# Patient Record
Sex: FEMALE | Race: WHITE | Employment: STUDENT | URBAN - METROPOLITAN AREA
[De-identification: names, ages, dates, MRNs, and addresses within clinical notes are randomized per-mention and may not be internally consistent; named-entity substitution may affect disease eponyms.]

---
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
## 2021-05-09 ENCOUNTER — HOSPITAL ENCOUNTER (EMERGENCY)
Age: 21
Discharge: HOME | End: 2021-05-09
Attending: EMERGENCY MEDICINE
Payer: MEDICAID

## 2021-05-09 VITALS
OXYGEN SATURATION: 100 % | BODY MASS INDEX: 26.21 KG/M2 | TEMPERATURE: 99.4 F | WEIGHT: 130 LBS | DIASTOLIC BLOOD PRESSURE: 79 MMHG | RESPIRATION RATE: 16 BRPM | HEIGHT: 59 IN | SYSTOLIC BLOOD PRESSURE: 129 MMHG | HEART RATE: 98 BPM

## 2021-05-09 DIAGNOSIS — R19.7 DIARRHEA, UNSPECIFIED TYPE: Primary | ICD-10-CM

## 2021-05-09 LAB
A/G RATIO: 1.3 (ref 1.1–2.2)
ALBUMIN SERPL-MCNC: 4.5 G/DL (ref 3.4–5)
ALP BLD-CCNC: 72 U/L (ref 40–129)
ALT SERPL-CCNC: 13 U/L (ref 10–40)
ANION GAP SERPL CALCULATED.3IONS-SCNC: 15 MMOL/L (ref 3–16)
AST SERPL-CCNC: 19 U/L (ref 15–37)
BASOPHILS ABSOLUTE: 0.1 K/UL (ref 0–0.2)
BASOPHILS RELATIVE PERCENT: 0.6 %
BILIRUB SERPL-MCNC: <0.2 MG/DL (ref 0–1)
BILIRUBIN URINE: NEGATIVE
BLOOD, URINE: NEGATIVE
BUN BLDV-MCNC: 6 MG/DL (ref 7–20)
CALCIUM SERPL-MCNC: 9.5 MG/DL (ref 8.3–10.6)
CHLORIDE BLD-SCNC: 101 MMOL/L (ref 99–110)
CLARITY: CLEAR
CO2: 21 MMOL/L (ref 21–32)
COLOR: YELLOW
CREAT SERPL-MCNC: 0.6 MG/DL (ref 0.6–1.1)
EOSINOPHILS ABSOLUTE: 0.2 K/UL (ref 0–0.6)
EOSINOPHILS RELATIVE PERCENT: 2.2 %
GFR AFRICAN AMERICAN: >60
GFR NON-AFRICAN AMERICAN: >60
GLOBULIN: 3.5 G/DL
GLUCOSE BLD-MCNC: 104 MG/DL (ref 70–99)
GLUCOSE URINE: NEGATIVE MG/DL
HCG QUALITATIVE: NEGATIVE
HCT VFR BLD CALC: 41.7 % (ref 36–48)
HEMOGLOBIN: 14.5 G/DL (ref 12–16)
KETONES, URINE: NEGATIVE MG/DL
LEUKOCYTE ESTERASE, URINE: NEGATIVE
LIPASE: 22 U/L (ref 13–60)
LYMPHOCYTES ABSOLUTE: 4.2 K/UL (ref 1–5.1)
LYMPHOCYTES RELATIVE PERCENT: 44.2 %
MCH RBC QN AUTO: 31.4 PG (ref 26–34)
MCHC RBC AUTO-ENTMCNC: 34.9 G/DL (ref 31–36)
MCV RBC AUTO: 90 FL (ref 80–100)
MICROSCOPIC EXAMINATION: NORMAL
MONOCYTES ABSOLUTE: 0.8 K/UL (ref 0–1.3)
MONOCYTES RELATIVE PERCENT: 8.1 %
NEUTROPHILS ABSOLUTE: 4.3 K/UL (ref 1.7–7.7)
NEUTROPHILS RELATIVE PERCENT: 44.9 %
NITRITE, URINE: NEGATIVE
PDW BLD-RTO: 12 % (ref 12.4–15.4)
PH UA: 6.5 (ref 5–8)
PLATELET # BLD: 330 K/UL (ref 135–450)
PMV BLD AUTO: 8.5 FL (ref 5–10.5)
POTASSIUM REFLEX MAGNESIUM: 3.7 MMOL/L (ref 3.5–5.1)
PROTEIN UA: NEGATIVE MG/DL
RBC # BLD: 4.63 M/UL (ref 4–5.2)
SARS-COV-2, NAAT: NOT DETECTED
SODIUM BLD-SCNC: 137 MMOL/L (ref 136–145)
SPECIFIC GRAVITY UA: <=1.005 (ref 1–1.03)
TOTAL PROTEIN: 8 G/DL (ref 6.4–8.2)
URINE REFLEX TO CULTURE: NORMAL
URINE TYPE: NORMAL
UROBILINOGEN, URINE: 0.2 E.U./DL
WBC # BLD: 9.5 K/UL (ref 4–11)

## 2021-05-09 PROCEDURE — 36415 COLL VENOUS BLD VENIPUNCTURE: CPT

## 2021-05-09 PROCEDURE — 83690 ASSAY OF LIPASE: CPT

## 2021-05-09 PROCEDURE — 81003 URINALYSIS AUTO W/O SCOPE: CPT

## 2021-05-09 PROCEDURE — 84703 CHORIONIC GONADOTROPIN ASSAY: CPT

## 2021-05-09 PROCEDURE — 2580000003 HC RX 258: Performed by: EMERGENCY MEDICINE

## 2021-05-09 PROCEDURE — 87635 SARS-COV-2 COVID-19 AMP PRB: CPT

## 2021-05-09 PROCEDURE — 99283 EMERGENCY DEPT VISIT LOW MDM: CPT

## 2021-05-09 PROCEDURE — 85025 COMPLETE CBC W/AUTO DIFF WBC: CPT

## 2021-05-09 PROCEDURE — 80053 COMPREHEN METABOLIC PANEL: CPT

## 2021-05-09 RX ORDER — 0.9 % SODIUM CHLORIDE 0.9 %
1000 INTRAVENOUS SOLUTION INTRAVENOUS ONCE
Status: COMPLETED | OUTPATIENT
Start: 2021-05-09 | End: 2021-05-09

## 2021-05-09 RX ADMIN — SODIUM CHLORIDE 1000 ML: 9 INJECTION, SOLUTION INTRAVENOUS at 20:08

## 2021-05-09 ASSESSMENT — PAIN DESCRIPTION - PAIN TYPE: TYPE: ACUTE PAIN

## 2021-05-09 ASSESSMENT — PAIN SCALES - GENERAL: PAINLEVEL_OUTOF10: 5

## 2021-05-09 ASSESSMENT — PAIN DESCRIPTION - ONSET: ONSET: PROGRESSIVE

## 2021-05-09 ASSESSMENT — PAIN DESCRIPTION - LOCATION: LOCATION: ABDOMEN;BACK

## 2021-05-10 NOTE — ED PROVIDER NOTES
[address] [facility]  ED      CHIEF COMPLAINT  Abdominal Pain (Patietn c/o of lower back and lower abdominal pain for one day. Diarrhea noted also. ), Back Pain, and Fever       HISTORY OF PRESENT ILLNESS  Jaja Martinez is a 22 y.o. female  who presents to the ED for evaluation of loose stools. Patient reports having loose stools over the past 3 days. Reports she has been having about 3 episodes of loose stools. Reports her stools had some yellow specks. Denies any blood in the stool or black stools. Says she has also been having some cramping to the lower abdomen that is often relieved with stools. Denies any changes in symptoms. Reports she has not tried any medications at home. Denies having dysuria, urinary urgency, or frequency. Reports her highest temp at home was 100. Says her temperature was elevated at 99.1 here. No other complaints, modifying factors or associated symptoms. I have reviewed the following from the nursing documentation. Past Medical History:   Diagnosis Date    Asthma      Past Surgical History:   Procedure Laterality Date    ADENOIDECTOMY       History reviewed. No pertinent family history.   Social History     Socioeconomic History    Marital status: Single     Spouse name: Not on file    Number of children: Not on file    Years of education: Not on file    Highest education level: Not on file   Occupational History    Not on file   Social Needs    Financial resource strain: Not on file    Food insecurity     Worry: Not on file     Inability: Not on file    Transportation needs     Medical: Not on file     Non-medical: Not on file   Tobacco Use    Smoking status: Never Smoker    Smokeless tobacco: Never Used   Substance and Sexual Activity    Alcohol use: No    Drug use: Not Currently    Sexual activity: Yes     Partners: Male   Lifestyle    Physical activity     Days per week: Not on file     Minutes per session: Not on file    Stress: Not on Rapid    Specimen: Nasopharyngeal Swab; Throat   Result Value Ref Range    SARS-CoV-2, NAAT Not Detected Not Detected   Comprehensive Metabolic Panel w/ Reflex to MG   Result Value Ref Range    Sodium 137 136 - 145 mmol/L    Potassium reflex Magnesium 3.7 3.5 - 5.1 mmol/L    Chloride 101 99 - 110 mmol/L    CO2 21 21 - 32 mmol/L    Anion Gap 15 3 - 16    Glucose 104 (H) 70 - 99 mg/dL    BUN 6 (L) 7 - 20 mg/dL    CREATININE 0.6 0.6 - 1.1 mg/dL    GFR Non-African American >60 >60    GFR African American >60 >60    Calcium 9.5 8.3 - 10.6 mg/dL    Total Protein 8.0 6.4 - 8.2 g/dL    Albumin 4.5 3.4 - 5.0 g/dL    Albumin/Globulin Ratio 1.3 1.1 - 2.2    Total Bilirubin <0.2 0.0 - 1.0 mg/dL    Alkaline Phosphatase 72 40 - 129 U/L    ALT 13 10 - 40 U/L    AST 19 15 - 37 U/L    Globulin 3.5 g/dL   CBC auto differential   Result Value Ref Range    WBC 9.5 4.0 - 11.0 K/uL    RBC 4.63 4.00 - 5.20 M/uL    Hemoglobin 14.5 12.0 - 16.0 g/dL    Hematocrit 41.7 36.0 - 48.0 %    MCV 90.0 80.0 - 100.0 fL    MCH 31.4 26.0 - 34.0 pg    MCHC 34.9 31.0 - 36.0 g/dL    RDW 12.0 (L) 12.4 - 15.4 %    Platelets 136 329 - 477 K/uL    MPV 8.5 5.0 - 10.5 fL    Neutrophils % 44.9 %    Lymphocytes % 44.2 %    Monocytes % 8.1 %    Eosinophils % 2.2 %    Basophils % 0.6 %    Neutrophils Absolute 4.3 1.7 - 7.7 K/uL    Lymphocytes Absolute 4.2 1.0 - 5.1 K/uL    Monocytes Absolute 0.8 0.0 - 1.3 K/uL    Eosinophils Absolute 0.2 0.0 - 0.6 K/uL    Basophils Absolute 0.1 0.0 - 0.2 K/uL   Urinalysis Reflex to Culture    Specimen: Urine, clean catch   Result Value Ref Range    Color, UA Yellow Straw/Yellow    Clarity, UA Clear Clear    Glucose, Ur Negative Negative mg/dL    Bilirubin Urine Negative Negative    Ketones, Urine Negative Negative mg/dL    Specific Gravity, UA <=1.005 1.005 - 1.030    Blood, Urine Negative Negative    pH, UA 6.5 5.0 - 8.0    Protein, UA Negative Negative mg/dL    Urobilinogen, Urine 0.2 <2.0 E.U./dL    Nitrite, Urine Negative Negative    Leukocyte Esterase, Urine Negative Negative    Microscopic Examination Not Indicated     Urine Type NotGiven     Urine Reflex to Culture Not Indicated    HCG Qualitative, Serum   Result Value Ref Range    hCG Qual Negative Detects HCG level >10 MIU/mL   Lipase   Result Value Ref Range    Lipase 22.0 13.0 - 60.0 U/L       I have reviewed all labs for this visit. RADIOLOGY  No results found. ED COURSE/MDM  Patient seen and evaluated. At presentation, patient was awake, alert, afebrile, normotensive, tachycardic to 130 after IV which quickly improved to 101. Patient had soft, nondistended abdomen that was nontender to palpation throughout all 4 quadrants. She had no tenderness to palpation over the left lower quadrant or right lower quadrant. Discussed given no tenderness to palpation over the right lower quadrant, and the abdominal pain that is relieved after bowel movement, no fevers, I have low suspicion for appendicitis at this time. Given no blood in the stool, no recent antibiotics, have low suspicion for C. difficile. Patient denies any recent travels/camping. Given 3 loose stools per day, discussed following up with PCP for further evaluation and treatment which may include stool studies. Made shared decision with patient and she declined for study at this time. However, basic labs,pregnancy test, and urinalysis obtained to assess for ectopic pregnancy/ UTI/ pyelonephritis. Patient given 1 L IV fluid bolus. On reassessment, heart rate improved to 78. Creatinine normal.  No leukocytosis present. Urinalysis negative. Pregnancy test negative. Lipase within normal limits. Patient instructed to follow-up with PCP for further evaluation and treatment. She was discharged home with strict return precautions. Pt was seen during the [place] 19 pandemic. Appropriate PPE worn by ME during patient encounters.  Pt seen during a time with constrained hospital bed capacity and other potential

## 2022-02-08 ENCOUNTER — HOSPITAL ENCOUNTER (EMERGENCY)
Age: 22
Discharge: HOME | End: 2022-02-08
Payer: MEDICAID

## 2022-02-08 VITALS
WEIGHT: 130 LBS | RESPIRATION RATE: 18 BRPM | BODY MASS INDEX: 26.21 KG/M2 | SYSTOLIC BLOOD PRESSURE: 129 MMHG | OXYGEN SATURATION: 99 % | TEMPERATURE: 98.3 F | DIASTOLIC BLOOD PRESSURE: 91 MMHG | HEIGHT: 59 IN | HEART RATE: 98 BPM

## 2022-02-08 DIAGNOSIS — R51.9 NONINTRACTABLE EPISODIC HEADACHE, UNSPECIFIED HEADACHE TYPE: ICD-10-CM

## 2022-02-08 DIAGNOSIS — R11.2 NAUSEA AND VOMITING, INTRACTABILITY OF VOMITING NOT SPECIFIED, UNSPECIFIED VOMITING TYPE: Primary | ICD-10-CM

## 2022-02-08 LAB
A/G RATIO: 1.5 (ref 1.1–2.2)
ALBUMIN SERPL-MCNC: 4.4 G/DL (ref 3.4–5)
ALP BLD-CCNC: 75 U/L (ref 40–129)
ALT SERPL-CCNC: 10 U/L (ref 10–40)
ANION GAP SERPL CALCULATED.3IONS-SCNC: 18 MMOL/L (ref 3–16)
AST SERPL-CCNC: 17 U/L (ref 15–37)
BASOPHILS ABSOLUTE: 0 K/UL (ref 0–0.2)
BASOPHILS RELATIVE PERCENT: 0.2 %
BILIRUB SERPL-MCNC: <0.2 MG/DL (ref 0–1)
BUN BLDV-MCNC: 7 MG/DL (ref 7–20)
CALCIUM SERPL-MCNC: 9.1 MG/DL (ref 8.3–10.6)
CHLORIDE BLD-SCNC: 100 MMOL/L (ref 99–110)
CO2: 21 MMOL/L (ref 21–32)
CREAT SERPL-MCNC: 0.7 MG/DL (ref 0.6–1.1)
EOSINOPHILS ABSOLUTE: 0.1 K/UL (ref 0–0.6)
EOSINOPHILS RELATIVE PERCENT: 0.9 %
GFR AFRICAN AMERICAN: >60
GFR NON-AFRICAN AMERICAN: >60
GLUCOSE BLD-MCNC: 107 MG/DL (ref 70–99)
HCG QUALITATIVE: NEGATIVE
HCT VFR BLD CALC: 41.2 % (ref 36–48)
HEMOGLOBIN: 14.1 G/DL (ref 12–16)
LYMPHOCYTES ABSOLUTE: 2.5 K/UL (ref 1–5.1)
LYMPHOCYTES RELATIVE PERCENT: 17.2 %
MCH RBC QN AUTO: 30.7 PG (ref 26–34)
MCHC RBC AUTO-ENTMCNC: 34.2 G/DL (ref 31–36)
MCV RBC AUTO: 89.7 FL (ref 80–100)
MONOCYTES ABSOLUTE: 0.8 K/UL (ref 0–1.3)
MONOCYTES RELATIVE PERCENT: 5.5 %
NEUTROPHILS ABSOLUTE: 10.9 K/UL (ref 1.7–7.7)
NEUTROPHILS RELATIVE PERCENT: 76.2 %
PDW BLD-RTO: 11.9 % (ref 12.4–15.4)
PLATELET # BLD: 311 K/UL (ref 135–450)
PMV BLD AUTO: 8.8 FL (ref 5–10.5)
POTASSIUM REFLEX MAGNESIUM: 4.2 MMOL/L (ref 3.5–5.1)
RBC # BLD: 4.59 M/UL (ref 4–5.2)
SODIUM BLD-SCNC: 139 MMOL/L (ref 136–145)
TOTAL PROTEIN: 7.4 G/DL (ref 6.4–8.2)
WBC # BLD: 14.3 K/UL (ref 4–11)

## 2022-02-08 PROCEDURE — 96374 THER/PROPH/DIAG INJ IV PUSH: CPT

## 2022-02-08 PROCEDURE — 85025 COMPLETE CBC W/AUTO DIFF WBC: CPT

## 2022-02-08 PROCEDURE — 84703 CHORIONIC GONADOTROPIN ASSAY: CPT

## 2022-02-08 PROCEDURE — 2580000003 HC RX 258: Performed by: NURSE PRACTITIONER

## 2022-02-08 PROCEDURE — 80053 COMPREHEN METABOLIC PANEL: CPT

## 2022-02-08 PROCEDURE — 96375 TX/PRO/DX INJ NEW DRUG ADDON: CPT

## 2022-02-08 PROCEDURE — 6360000002 HC RX W HCPCS: Performed by: NURSE PRACTITIONER

## 2022-02-08 PROCEDURE — 99283 EMERGENCY DEPT VISIT LOW MDM: CPT

## 2022-02-08 RX ORDER — ONDANSETRON 4 MG/1
4 TABLET, ORALLY DISINTEGRATING ORAL EVERY 8 HOURS PRN
Qty: 20 TABLET | Refills: 0 | Status: SHIPPED | OUTPATIENT
Start: 2022-02-08

## 2022-02-08 RX ORDER — PROCHLORPERAZINE EDISYLATE 5 MG/ML
10 INJECTION INTRAMUSCULAR; INTRAVENOUS EVERY 6 HOURS PRN
Status: DISCONTINUED | OUTPATIENT
Start: 2022-02-08 | End: 2022-02-08

## 2022-02-08 RX ORDER — DIPHENHYDRAMINE HYDROCHLORIDE 50 MG/ML
25 INJECTION INTRAMUSCULAR; INTRAVENOUS ONCE
Status: COMPLETED | OUTPATIENT
Start: 2022-02-08 | End: 2022-02-08

## 2022-02-08 RX ORDER — PROCHLORPERAZINE EDISYLATE 5 MG/ML
10 INJECTION INTRAMUSCULAR; INTRAVENOUS EVERY 6 HOURS PRN
Status: DISCONTINUED | OUTPATIENT
Start: 2022-02-08 | End: 2022-02-09 | Stop reason: HOSPADM

## 2022-02-08 RX ORDER — KETOROLAC TROMETHAMINE 30 MG/ML
15 INJECTION, SOLUTION INTRAMUSCULAR; INTRAVENOUS ONCE
Status: COMPLETED | OUTPATIENT
Start: 2022-02-08 | End: 2022-02-08

## 2022-02-08 RX ADMIN — PROCHLORPERAZINE EDISYLATE 10 MG: 5 INJECTION INTRAMUSCULAR; INTRAVENOUS at 22:00

## 2022-02-08 RX ADMIN — DEXTROSE AND SODIUM CHLORIDE 1000 ML: 5; 900 INJECTION, SOLUTION INTRAVENOUS at 22:51

## 2022-02-08 RX ADMIN — DIPHENHYDRAMINE HYDROCHLORIDE 25 MG: 50 INJECTION, SOLUTION INTRAMUSCULAR; INTRAVENOUS at 22:00

## 2022-02-08 RX ADMIN — KETOROLAC TROMETHAMINE 15 MG: 30 INJECTION, SOLUTION INTRAMUSCULAR at 22:00

## 2022-02-08 ASSESSMENT — PAIN SCALES - GENERAL
PAINLEVEL_OUTOF10: 6
PAINLEVEL_OUTOF10: 2
PAINLEVEL_OUTOF10: 6

## 2022-02-08 ASSESSMENT — PAIN DESCRIPTION - LOCATION: LOCATION: HEAD

## 2022-02-08 ASSESSMENT — PAIN DESCRIPTION - PAIN TYPE: TYPE: ACUTE PAIN

## 2022-02-09 NOTE — ED NOTES
Discharge instructions given. Verbalized understanding. Denies further questions or concerns. A&Ox4. Ambulates from ED with steady gait.       Ian Pond RN  02/08/22 [id number]

## 2022-02-09 NOTE — ED PROVIDER NOTES
Evaluated by [id number] [facility] Provider    [address] [facility]  ED    CHIEF COMPLAINT    Headache (started today, 6/10 pain, associated with n/v, uti yesterday, prescribed abx), Nausea, and Emesis    HISTORY OF PRESENT ILLNESS  Sanchez Elkins is a 25 y.o. female who presents to the ED complaining of headache. She started with a HA today and then developed nausea, vomiting. She was diagnosed with UTI yesterday and started antibiotics, Bactrim. She took tylenol but it is not helping for the headache. She was started on bactrim. Does not typically get HA. Denies neck pain. Patient reports nausea and vomiting in addition to the headache. She denies chest pain or shortness of breath. Patient reports the pain is constant and is currently rating it as 8/10 and describes it as an aching type of pain. The patient arrived to the ED via private car. PAST MEDICAL HISTORY    Past Medical History:   Diagnosis Date    Asthma        SURGICAL HISTORY    Past Surgical History:   Procedure Laterality Date    ADENOIDECTOMY         CURRENT MEDICATIONS    Current Outpatient Rx   Medication Sig Dispense Refill    ondansetron (ZOFRAN ODT) 4 MG disintegrating tablet Take 1 tablet by mouth every 8 hours as needed for Nausea 20 tablet 0    drospirenone-ethinyl estradiol (FAZAL) 3-0.02 MG per tablet Take 1 tablet by mouth         ALLERGIES    Allergies   Allergen Reactions    Motrin [Ibuprofen Micronized] Other (See Comments)     LETHARGIC      Penicillins Rash       FAMILY HISTORY    History reviewed. No pertinent family history.     SOCIAL HISTORY    Social History     Socioeconomic History    Marital status: Single     Spouse name: None    Number of children: None    Years of education: None    Highest education level: None   Occupational History    None   Tobacco Use    Smoking status: Never Smoker    Smokeless tobacco: Never Used   Vaping Use    Vaping Use: Never used   Substance and Sexual Activity    Alcohol use: No    Drug use: Not Currently    Sexual activity: Yes     Partners: Male   Other Topics Concern    None   Social History Narrative    None     Social Determinants of Health     Financial Resource Strain:     Difficulty of Paying Living Expenses: Not on file   Food Insecurity:     Worried About Running Out of Food in the Last Year: Not on file    Jayce of Food in the Last Year: Not on file   Transportation Needs:     Lack of Transportation (Medical): Not on file    Lack of Transportation (Non-Medical): Not on file   Physical Activity:     Days of Exercise per Week: Not on file    Minutes of Exercise per Session: Not on file   Stress:     Feeling of Stress : Not on file   Social Connections:     Frequency of Communication with Friends and Family: Not on file    Frequency of Social Gatherings with Friends and Family: Not on file    Attends Yarsanism Services: Not on file    Active Member of [address] [place] [address] [employer] or Organizations: Not on file    Attends Club or Organization Meetings: Not on file    Marital Status: Not on file   Intimate Partner Violence:     Fear of Current or Ex-Partner: Not on file    Emotionally Abused: Not on file    Physically Abused: Not on file    Sexually Abused: Not on file   Housing Stability:     Unable to Pay for Housing in the Last Year: Not on file    Number of [place] in the Last Year: Not on file    Unstable Housing in the Last Year: Not on file     REVIEW OFSYSTEMS    10systems reviewed, pertinent positives per HPI otherwise noted to be negative    PHYSICAL EXAM  Physical Exam  Vitals:    02/08/22 2110   BP: (!) 129/91   Pulse: 98   Resp: 18   Temp: 98.3 °F (36.8 °C)   SpO2: 99%     GENERAL: Patient is well-developed, well-nourished,  no acutedistress. Mildly distressed due to her current symptoms but not toxic in appearance. HEENT:  PERRL, EOMI. Normocephalic, atraumatic. Conjunctiva appear normal.  External ears are normal.    NECK: Supple with normal ROM.  Trachea midline. Negative Kernig's and Brudzinski's. LUNGS:  Normal work of breathing. Speaking comfortably in full sentences. Lungs are clear bilaterally to auscultation. Without wheezing, rales, or rhonchi. CADIOVASCULAR:  Regular rate and rhythm. Normal S1-S2 sounds. No murmurs, rubs, or gallops. GI: Soft, nontender, nondistended with positive bowel sounds. No rebound tenderness, guarding or any peritoneal signs. No masses or hepatosplenomegaly   EXTREMITIES: Distal pulses w/o edema. MUSCULOSKELETAL:  Atraumatic extremities with normal ROM grossly. No obvious bony deformities. SKIN: Warm/dry. No rashes/lesions noted. PSYCHIATRIC: Patient is alert and oriented with normal affect  NEUROLOGIC: Cranial nerves II through XII are intact. Alert and oriented. Moves all extremities with equal strength. No gross sensory deficits. Answers questions/follows commands appropriately. LABS  I have reviewed all labs for this visit.    Results for orders placed or performed during the hospital encounter of 02/08/22   CBC Auto Differential   Result Value Ref Range    WBC 14.3 (H) 4.0 - 11.0 K/uL    RBC 4.59 4.00 - 5.20 M/uL    Hemoglobin 14.1 12.0 - 16.0 g/dL    Hematocrit 41.2 36.0 - 48.0 %    MCV 89.7 80.0 - 100.0 fL    MCH 30.7 26.0 - 34.0 pg    MCHC 34.2 31.0 - 36.0 g/dL    RDW 11.9 (L) 12.4 - 15.4 %    Platelets 825 147 - 470 K/uL    MPV 8.8 5.0 - 10.5 fL    Neutrophils % 76.2 %    Lymphocytes % 17.2 %    Monocytes % 5.5 %    Eosinophils % 0.9 %    Basophils % 0.2 %    Neutrophils Absolute 10.9 (H) 1.7 - 7.7 K/uL    Lymphocytes Absolute 2.5 1.0 - 5.1 K/uL    Monocytes Absolute 0.8 0.0 - 1.3 K/uL    Eosinophils Absolute 0.1 0.0 - 0.6 K/uL    Basophils Absolute 0.0 0.0 - 0.2 K/uL   Comprehensive Metabolic Panel w/ Reflex to MG   Result Value Ref Range    Sodium 139 136 - 145 mmol/L    Potassium reflex Magnesium 4.2 3.5 - 5.1 mmol/L    Chloride 100 99 - 110 mmol/L    CO2 21 21 - 32 mmol/L    Anion Gap 18 (H) 3 - 16    Glucose 107 (H) 70 - 99 mg/dL    BUN 7 7 - 20 mg/dL    CREATININE 0.7 0.6 - 1.1 mg/dL    GFR Non-African American >60 >60    GFR African American >60 >60    Calcium 9.1 8.3 - 10.6 mg/dL    Total Protein 7.4 6.4 - 8.2 g/dL    Albumin 4.4 3.4 - 5.0 g/dL    Albumin/Globulin Ratio 1.5 1.1 - 2.2    Total Bilirubin <0.2 0.0 - 1.0 mg/dL    Alkaline Phosphatase 75 40 - 129 U/L    ALT 10 10 - 40 U/L    AST 17 15 - 37 U/L   HCG Qualitative, Serum   Result Value Ref Range    hCG Qual Negative Detects HCG level >10 MIU/mL     RADIOLOGY    No results found. ED COURSE & MEDICAL DECISION MAKING    Patient seen and evaluated. Old records reviewed. Diagnostic testing reviewed and results discussed. I have evaluated this patient. My supervising physician was available for consultation. Madhavi Mancia presented to the ED today with above noted complaints. On physical exam patient is neurologically intact and without focal or lateralizing deficits on exam. Patient is currently afebrile, not tachycardic, BP is normotensive. Patient is without signs of meningeal irritation. I am not concerned for infectious cause of the headache at this time. Blood work shows leukocytosis is WBC elevated at 14.3, absolute neutrophils elevated at 10.9. No further differential shift. No anemia. No electrolyte abnormality. No evidence of acute kidney injury or transaminitis. Pregnancy test negative. Pt was given the following medications or treatments in the ED:   Medications   prochlorperazine (COMPAZINE) injection 10 mg (10 mg IntraVENous Given 2/8/22 2200)   dextrose 5 % and 0.9 % nacl bolus (0 mLs IntraVENous Stopped 2/8/22 2333)   ketorolac (TORADOL) injection 15 mg (15 mg IntraVENous Given 2/8/22 2200)   diphenhydrAMINE (BENADRYL) injection 25 mg (25 mg IntraVENous Given 2/8/22 2200)     Patient was given the above medications and upon reevaluation reported significant improvement in her symptoms.   She was able to tolerate an oral challenge here in the ER. She reported that her headache had completely resolved. At this point I do not feel the patient requires further work up and it is reasonable to discharge the patient. A discussion was had with the patient regarding diagnosis, diagnostic testing results,treatment/ plan of care, and follow up. All questions were answered. Patient will follow up as directed for further evaluation/treatment. The patient was given strict return precautions as we discussed symptoms that wouldnecessitate return to the ED. Patient will return to ED for new/worsening symptoms. The patient verbalized their understanding and agreement with the above plan. Patient will be started on the following medicationsfrom the ED:  Discharge Medication List as of 2/8/2022 11:52 PM      START taking these medications    Details   ondansetron (ZOFRAN ODT) 4 MG disintegrating tablet Take 1 tablet by mouth every 8 hours as needed for Nausea, Disp-20 tablet, R-0Normal           I estimatethere is LOW risk for SUBARACHNOID HEMORRHAGE, MENINGITIS, INTRACRANIAL HEMORRHAGE, SUBDURAL HEMATOMA, OR STROKE, thus I consider the discharge disposition reasonable. Felipe Swanson and I have discussed the diagnosis and risks, andwe agree with discharging home to follow-up with their primary doctor. We also discussed returning to the Emergency Department immediately if new or worsening symptoms occur. We have discussed the symptoms which are mostconcerning (e.g., changing or worsening pain, weakness, vomiting, fever) that necessitate immediate return. Clinical Impression    1. Nausea and vomiting, intractability of vomiting not specified, unspecified vomiting type    2. Nonintractable episodic headache, unspecified headache type        Discharge Vital Signs:  Blood pressure (!) 129/91, pulse 98, temperature 98.3 °F (36.8 °C), temperature source Oral, resp.  rate 18, height 4' 11\" (1.499 m), weight 130 lb (59 kg), SpO2 99 %. FOLLOW UP  Raheem Patel MD  [address] [place]  [phone]    Call in 2 days  For further evaluation    [facility]  [facility] [address] [phone]  Go to   If symptoms worsen      DISPOSITION  Patient was discharged to home in good condition. Comment: Please note this report has been produced using speech recognition software and may contain errors related to thatsystem including errors in grammar, punctuation, and spelling, as well as words and phrases that may be inappropriate. If there are any questions or concerns please feel free to contact the dictating provider forclarification.         ANTIONETTE Humphreys - CNP  02/09/22 9267

## 2022-02-09 NOTE — ED NOTES
Pt drank glass of water and ate long crackers and pretzels. No nausea reported.       Miracle Silveira RN  02/08/22 6461

## 2022-11-10 ENCOUNTER — HOSPITAL ENCOUNTER (EMERGENCY)
Age: 22
Discharge: HOME | End: 2022-11-10
Payer: MEDICAID

## 2022-11-10 ENCOUNTER — APPOINTMENT (OUTPATIENT)
Dept: GENERAL RADIOLOGY | Age: 22
End: 2022-11-10
Payer: MEDICAID

## 2022-11-10 VITALS
SYSTOLIC BLOOD PRESSURE: 159 MMHG | DIASTOLIC BLOOD PRESSURE: 85 MMHG | OXYGEN SATURATION: 100 % | WEIGHT: 130 LBS | TEMPERATURE: 99 F | RESPIRATION RATE: 16 BRPM | HEIGHT: 59 IN | BODY MASS INDEX: 26.21 KG/M2 | HEART RATE: 99 BPM

## 2022-11-10 DIAGNOSIS — M54.6 PAIN IN THORACIC SPINE: ICD-10-CM

## 2022-11-10 DIAGNOSIS — R03.0 ELEVATED BLOOD PRESSURE READING: ICD-10-CM

## 2022-11-10 DIAGNOSIS — R07.1 CHEST PAIN ON BREATHING: Primary | ICD-10-CM

## 2022-11-10 LAB
A/G RATIO: 1.4 (ref 1.1–2.2)
ALBUMIN SERPL-MCNC: 4.7 G/DL (ref 3.4–5)
ALP BLD-CCNC: 88 U/L (ref 40–129)
ALT SERPL-CCNC: 17 U/L (ref 10–40)
ANION GAP SERPL CALCULATED.3IONS-SCNC: 10 MMOL/L (ref 3–16)
AST SERPL-CCNC: 17 U/L (ref 15–37)
BASOPHILS ABSOLUTE: 0.1 K/UL (ref 0–0.2)
BASOPHILS RELATIVE PERCENT: 0.6 %
BILIRUB SERPL-MCNC: 0.3 MG/DL (ref 0–1)
BUN BLDV-MCNC: 7 MG/DL (ref 7–20)
CALCIUM SERPL-MCNC: 9.3 MG/DL (ref 8.3–10.6)
CHLORIDE BLD-SCNC: 100 MMOL/L (ref 99–110)
CO2: 25 MMOL/L (ref 21–32)
CREAT SERPL-MCNC: 0.6 MG/DL (ref 0.6–1.1)
D DIMER: 0.4 UG/ML FEU (ref 0–0.6)
EOSINOPHILS ABSOLUTE: 0.2 K/UL (ref 0–0.6)
EOSINOPHILS RELATIVE PERCENT: 1.8 %
GFR SERPL CREATININE-BSD FRML MDRD: >60 ML/MIN/{1.73_M2}
GLUCOSE BLD-MCNC: 107 MG/DL (ref 70–99)
HCG QUALITATIVE: NEGATIVE
HCT VFR BLD CALC: 41.8 % (ref 36–48)
HEMOGLOBIN: 14.1 G/DL (ref 12–16)
LYMPHOCYTES ABSOLUTE: 2.4 K/UL (ref 1–5.1)
LYMPHOCYTES RELATIVE PERCENT: 19.7 %
MAGNESIUM: 2 MG/DL (ref 1.8–2.4)
MCH RBC QN AUTO: 30 PG (ref 26–34)
MCHC RBC AUTO-ENTMCNC: 33.7 G/DL (ref 31–36)
MCV RBC AUTO: 89 FL (ref 80–100)
MONOCYTES ABSOLUTE: 0.9 K/UL (ref 0–1.3)
MONOCYTES RELATIVE PERCENT: 7 %
NEUTROPHILS ABSOLUTE: 8.7 K/UL (ref 1.7–7.7)
NEUTROPHILS RELATIVE PERCENT: 70.9 %
PDW BLD-RTO: 12.3 % (ref 12.4–15.4)
PLATELET # BLD: 289 K/UL (ref 135–450)
PMV BLD AUTO: 8.2 FL (ref 5–10.5)
POTASSIUM REFLEX MAGNESIUM: 3.4 MMOL/L (ref 3.5–5.1)
RBC # BLD: 4.69 M/UL (ref 4–5.2)
SODIUM BLD-SCNC: 135 MMOL/L (ref 136–145)
TOTAL PROTEIN: 8 G/DL (ref 6.4–8.2)
TROPONIN: <0.01 NG/ML
WBC # BLD: 12.2 K/UL (ref 4–11)

## 2022-11-10 PROCEDURE — 85379 FIBRIN DEGRADATION QUANT: CPT

## 2022-11-10 PROCEDURE — 83735 ASSAY OF MAGNESIUM: CPT

## 2022-11-10 PROCEDURE — 99285 EMERGENCY DEPT VISIT HI MDM: CPT

## 2022-11-10 PROCEDURE — 71046 X-RAY EXAM CHEST 2 VIEWS: CPT

## 2022-11-10 PROCEDURE — 93005 ELECTROCARDIOGRAM TRACING: CPT | Performed by: PHYSICIAN ASSISTANT

## 2022-11-10 PROCEDURE — 84703 CHORIONIC GONADOTROPIN ASSAY: CPT

## 2022-11-10 PROCEDURE — 84484 ASSAY OF TROPONIN QUANT: CPT

## 2022-11-10 PROCEDURE — 6370000000 HC RX 637 (ALT 250 FOR IP): Performed by: PHYSICIAN ASSISTANT

## 2022-11-10 PROCEDURE — 85025 COMPLETE CBC W/AUTO DIFF WBC: CPT

## 2022-11-10 PROCEDURE — 80053 COMPREHEN METABOLIC PANEL: CPT

## 2022-11-10 RX ORDER — NAPROXEN 500 MG/1
500 TABLET ORAL 2 TIMES DAILY
Qty: 20 TABLET | Refills: 0 | Status: SHIPPED | OUTPATIENT
Start: 2022-11-10 | End: 2022-11-23

## 2022-11-10 RX ORDER — LIDOCAINE 4 G/G
1 PATCH TOPICAL DAILY
Qty: 30 PATCH | Refills: 0 | Status: SHIPPED | OUTPATIENT
Start: 2022-11-10 | End: 2022-11-23

## 2022-11-10 RX ORDER — ASPIRIN 325 MG
325 TABLET ORAL ONCE
Status: COMPLETED | OUTPATIENT
Start: 2022-11-10 | End: 2022-11-10

## 2022-11-10 RX ADMIN — ASPIRIN 325 MG: 325 TABLET ORAL at 20:33

## 2022-11-10 ASSESSMENT — ENCOUNTER SYMPTOMS
SINUS PRESSURE: 0
DIARRHEA: 0
COUGH: 0
NAUSEA: 0
VOMITING: 0
SORE THROAT: 0
RHINORRHEA: 0
EYE REDNESS: 0
ABDOMINAL PAIN: 0
SHORTNESS OF BREATH: 0
CONSTIPATION: 0
SINUS PAIN: 0
CHEST TIGHTNESS: 0
EYE DISCHARGE: 0
BACK PAIN: 1

## 2022-11-10 ASSESSMENT — PAIN DESCRIPTION - PAIN TYPE: TYPE: ACUTE PAIN

## 2022-11-10 ASSESSMENT — PAIN DESCRIPTION - LOCATION: LOCATION: BACK

## 2022-11-10 ASSESSMENT — PAIN DESCRIPTION - FREQUENCY: FREQUENCY: CONTINUOUS

## 2022-11-10 ASSESSMENT — PAIN SCALES - GENERAL: PAINLEVEL_OUTOF10: 6

## 2022-11-10 ASSESSMENT — PAIN - FUNCTIONAL ASSESSMENT: PAIN_FUNCTIONAL_ASSESSMENT: 0-10

## 2022-11-10 ASSESSMENT — PAIN DESCRIPTION - DESCRIPTORS: DESCRIPTORS: SHARP

## 2022-11-10 ASSESSMENT — PAIN DESCRIPTION - ONSET: ONSET: GRADUAL

## 2022-11-11 ENCOUNTER — TELEPHONE (OUTPATIENT)
Dept: CARDIOLOGY CLINIC | Age: 22
End: 2022-11-11

## 2022-11-11 NOTE — ED PROVIDER NOTES
[address] [facility]  ED  EMERGENCY DEPARTMENT ENCOUNTER        Pt Name: Minerva Martinez  MRN: [id number]  [place] 2000  Date of evaluation: 11/10/2022  Provider: Alem Padgett PA-C  PCP: Franco Askew MD  ED Attending: No att. providers found      This patient was not seen and evaluated by the attending physician No att. providers found. I have independently evaluated this patient. CHIEF COMPLAINT       Chief Complaint   Patient presents with    Congestion     Pt states she currently has strep on PO meds, started having pain in left side of her back with deep breathing today. HISTORY OF PRESENT ILLNESS   (Location/Symptom, Timing/Onset, Context/Setting, Quality, Duration, Modifying Factors, Severity)  Note limiting factors. Minerva Martinez is a 25 y.o. female for evaluation via private for thoracic back pain, worse with taking a deep breath. Has been ongoing for 3 months. Patient advised that it is coming and going. It is present over the past several days. Patient advised that she is taking medications for strep throat at this time. Patient denies any history of blood clot she was on birth control up until a month ago. Patient cannot determine a pattern to what brings her symptoms on. Patient denies any shortness of breath. Nursing Notes were all reviewed and agreed with or any disagreements were addressed  in the HPI. REVIEW OF SYSTEMS  (2-9 systems for level 4, 10 or more for level 5)     Review of Systems   Constitutional:  Negative for chills and fever. HENT: Negative. Negative for congestion, rhinorrhea, sinus pressure, sinus pain and sore throat. Eyes:  Negative for discharge, redness and visual disturbance. Respiratory:  Negative for cough, chest tightness and shortness of breath. Cardiovascular:  Negative for chest pain and palpitations. Gastrointestinal:  Negative for abdominal pain, constipation, diarrhea, nausea and vomiting. Genitourinary:  Negative for difficulty urinating, dysuria and frequency. Musculoskeletal:  Positive for back pain. Skin: Negative. Neurological: Negative. Negative for dizziness, weakness, numbness and headaches. Psychiatric/Behavioral: Negative. All other systems reviewed and are negative. Positivesand Pertinent negatives as per HPI. Except as noted above in the ROS, all other systems were reviewed and negative. PAST MEDICAL HISTORY     Past Medical History:   Diagnosis Date    Asthma          SURGICAL HISTORY       Past Surgical History:   Procedure Laterality Date    ADENOIDECTOMY           CURRENT MEDICATIONS       Previous Medications    DROSPIRENONE-ETHINYL ESTRADIOL (FAZAL) 3-0.02 MG PER TABLET    Take 1 tablet by mouth    ONDANSETRON (ZOFRAN ODT) 4 MG DISINTEGRATING TABLET    Take 1 tablet by mouth every 8 hours as needed for Nausea         ALLERGIES     Motrin [ibuprofen micronized] and Penicillins    FAMILY HISTORY     History reviewed. No pertinent family history. SOCIAL HISTORY       Social History     Socioeconomic History    Marital status: Single     Spouse name: None    Number of children: None    Years of education: None    Highest education level: None   Tobacco Use    Smoking status: Never    Smokeless tobacco: Never   Vaping Use    Vaping Use: Never used   Substance and Sexual Activity    Alcohol use: No    Drug use: Not Currently    Sexual activity: Yes     Partners: Male       SCREENINGS     NIH Score       [facility]     Heart Score         PHYSICAL EXAM    (up to 7 for level 4, 8 ormore for level 5)     ED Triage Vitals [11/10/22 1913]   BP Temp Temp Source Heart Rate Resp SpO2 Height Weight   (!) 159/85 99 °F (37.2 °C) Oral 99 16 100 % 4' 11\" (1.499 m) 130 lb (59 kg)     GENERAL APPEARANCE: Awake and alert. Cooperative. No acute distress. HEAD: Normocephalic. Atraumatic. EYES: PERRL. EOM's grossly intact.    ENT: Mucous membranes are moist. NECK: Supple. Normal ROM. CHEST: Equal symmetric chest rise. RRR  LUNGS: Breathing is unlabored. Speaking comfortably in full sentences. LCAB  Abdomen: Nondistended  EXTREMITIES: MAEE. No acute deformities. SKIN: Warm and dry. NEUROLOGICAL: Alert and oriented. Strength is 5/5 in all extremities and sensation is intact. DIAGNOSTIC RESULTS   LABS:    Labs Reviewed   CBC WITH AUTO DIFFERENTIAL - Abnormal; Notable for the following components:       Result Value    WBC 12.2 (*)     RDW 12.3 (*)     Neutrophils Absolute 8.7 (*)     All other components within normal limits   COMPREHENSIVE METABOLIC PANEL W/ REFLEX TO MG FOR LOW K - Abnormal; Notable for the following components:    Sodium 135 (*)     Potassium reflex Magnesium 3.4 (*)     Glucose 107 (*)     All other components within normal limits   TROPONIN   HCG, SERUM, QUALITATIVE   D-DIMER, QUANTITATIVE   MAGNESIUM       All other labs were within normal range or notreturned as of this dictation. EKG: All EKG's are interpreted by the Emergency Department Physician who either signs or Co-signs this chart in the absence of a cardiologist.  Please see their note for interpretation of EKG. RADIOLOGY:         Interpretation per the Radiologist below, if available at the time of this note:    XR CHEST (2 VW)   Final Result   No acute cardiopulmonary process           XR CHEST (2 VW)    Result Date: 11/10/2022  EXAMINATION: TWO XRAY VIEWS OF THE CHEST 11/10/2022 7:20 pm COMPARISON: May 2, 2019 HISTORY: ORDERING SYSTEM PROVIDED HISTORY: cough TECHNOLOGIST PROVIDED HISTORY: Reason for exam:->cough Reason for Exam: cough FINDINGS: The cardiomediastinal silhouette is normal in size. The lungs are clear. No pleural effusion or pneumothorax is present.      No acute cardiopulmonary process         EMERGENCY DEPARTMENT COURSE and DIFFERENTIAL DIAGNOSIS/MDM:   Vitals:    Vitals:    11/10/22 1913   BP: (!) 159/85   Pulse: 99   Resp: 16   Temp: 99 °F (37.2 °C)   TempSrc: Oral   SpO2: 100%   Weight: 130 lb (59 kg)   Height: 4' 11\" (1.499 m)       Patient was given the following medications:  Medications   aspirin tablet 325 mg (325 mg Oral Given 11/10/22 2033)       Afebrile, stable, patient presents to the ED for evaluation. All questions are answered. EKG is sinus tachycardia with possible left atrial enlargement. Chest x-ray is clear. Shows no evidence of any cardiomegaly. Labs evaluated with a white blood cell count of 12.2. Potassium 3.4 glucose of 107 no elevation in troponin. No elevation in D-dimer. Patient is reassured. Advised follow-up with PCP/cardiology if symptoms persist.  All questions answered she is discharged in stable condition. indications for return to the ED are discussed. Patient is advised if any new or worsening symptoms arise they should immediately return to the emergency room. Follow-up with primary care in 1-2 days. The patient tolerated their visit well. The patient and / or the family were informed of the results of any tests, a time was given to answer questions, a plan was proposed and they agreed Sandee Berrios.     Results for orders placed or performed during the hospital encounter of 11/10/22   CBC with Auto Differential   Result Value Ref Range    WBC 12.2 (H) 4.0 - 11.0 K/uL    RBC 4.69 4.00 - 5.20 M/uL    Hemoglobin 14.1 12.0 - 16.0 g/dL    Hematocrit 41.8 36.0 - 48.0 %    MCV 89.0 80.0 - 100.0 fL    MCH 30.0 26.0 - 34.0 pg    MCHC 33.7 31.0 - 36.0 g/dL    RDW 12.3 (L) 12.4 - 15.4 %    Platelets 299 356 - 059 K/uL    MPV 8.2 5.0 - 10.5 fL    Neutrophils % 70.9 %    Lymphocytes % 19.7 %    Monocytes % 7.0 %    Eosinophils % 1.8 %    Basophils % 0.6 %    Neutrophils Absolute 8.7 (H) 1.7 - 7.7 K/uL    Lymphocytes Absolute 2.4 1.0 - 5.1 K/uL    Monocytes Absolute 0.9 0.0 - 1.3 K/uL    Eosinophils Absolute 0.2 0.0 - 0.6 K/uL    Basophils Absolute 0.1 0.0 - 0.2 K/uL   Comprehensive Metabolic Panel w/ Reflex to MG   Result Value Ref Range    Sodium 135 (L) 136 - 145 mmol/L    Potassium reflex Magnesium 3.4 (L) 3.5 - 5.1 mmol/L    Chloride 100 99 - 110 mmol/L    CO2 25 21 - 32 mmol/L    Anion Gap 10 3 - 16    Glucose 107 (H) 70 - 99 mg/dL    BUN 7 7 - 20 mg/dL    Creatinine 0.6 0.6 - 1.1 mg/dL    Est, Glom Filt Rate >60 >60    Calcium 9.3 8.3 - 10.6 mg/dL    Total Protein 8.0 6.4 - 8.2 g/dL    Albumin 4.7 3.4 - 5.0 g/dL    Albumin/Globulin Ratio 1.4 1.1 - 2.2    Total Bilirubin 0.3 0.0 - 1.0 mg/dL    Alkaline Phosphatase 88 40 - 129 U/L    ALT 17 10 - 40 U/L    AST 17 15 - 37 U/L   Troponin   Result Value Ref Range    Troponin <0.01 <0.01 ng/mL   HCG Qualitative, Serum   Result Value Ref Range    hCG Qual Negative Detects HCG level >10 MIU/mL   D-Dimer, Quantitative   Result Value Ref Range    D-Dimer, Quant 0.40 0.00 - 0.60 ug/mL FEU   Magnesium   Result Value Ref Range    Magnesium 2.00 1.80 - 2.40 mg/dL   EKG 12 Lead   Result Value Ref Range    Ventricular Rate 106 BPM    Atrial Rate 106 BPM    P-R Interval 160 ms    QRS Duration 92 ms    Q-T Interval 336 ms    QTc Calculation (Bazett) 446 ms    P Axis 59 degrees    R Axis 53 degrees    T Axis 42 degrees    Diagnosis       Sinus tachycardiaPossible Left atrial enlargementBorderline ECGNo previous ECGs available       I estimate there is LOW risk for PULMONARY EMBOLISM, ACUTE CORONARY SYNDROME, OR THORACIC AORTIC DISSECTION, thus I consider the discharge disposition reasonable. Darreld Si and I have discussed the diagnosis and risks, and we agree with discharging home to follow-up with their primary doctor. We also discussed returning to the Emergency Department immediately if new or worsening symptoms occur. We have discussed the symptoms which are most concerning (e.g., bloody sputum, fever, worsening pain or shortness of breath, vomiting) that necessitate immediate return. FINAL Impression    1. Chest pain on breathing    2. Elevated blood pressure reading    3.  Pain in thoracic spine        Blood pressure (!) 159/85, pulse 99, temperature 99 °F (37.2 °C), temperature source Oral, resp. rate 16, height 4' 11\" (1.499 m), weight 130 lb (59 kg), last menstrual period 10/27/2022, SpO2 100 %. FINAL IMPRESSION      1. Chest pain on breathing    2. Elevated blood pressure reading    3. Pain in thoracic spine          DISPOSITION/PLAN   DISPOSITION Decision To Discharge 11/10/2022 09:50:35 PM      PATIENT REFERRED TO:  Frieda Jean DO  [address] [phone]      to establish a PCP    [facility]  [phone]    to follow up on \"possible left atrial enlargement\"      DISCHARGE MEDICATIONS:  New Prescriptions    LIDOCAINE 4 % EXTERNAL PATCH    Place 1 patch onto the skin daily    NAPROXEN (NAPROSYN) 500 MG TABLET    Take 1 tablet by mouth 2 times daily for 20 doses       DISCONTINUED MEDICATIONS:  Discontinued Medications    No medications on file              Pt was seen during the COVID 19 pandemic. Appropriate PPE worn by ME during patient encounters. Pt seen during a time with constrained hospital bed capacity and other potential inpatient and outpatient resources were constrained due to the viral pandemic.    Please note that portions of this note were completed with a voice recognition program.  Efforts were made to edit the dictations but occasionally words are mis-transcribed.)    Caldwell Moritz, PA-C (electronically signed)        Caldwell Moritz, PA-C  11/10/22 1180

## 2022-11-11 NOTE — TELEPHONE ENCOUNTER
Pt called into office and stated she was referred to [facility] from the ER. ER told pt she should be seen within the next 1-2W. Next available at every office is 12/19. Please advise.

## 2022-11-11 NOTE — ED NOTES
Pt discharged in stable condition. Pt had no further questions at this time.       Keith Burns RN  11/10/22 3650

## 2022-11-12 LAB
EKG ATRIAL RATE: 106 BPM
EKG DIAGNOSIS: NORMAL
EKG P AXIS: 59 DEGREES
EKG P-R INTERVAL: 160 MS
EKG Q-T INTERVAL: 336 MS
EKG QRS DURATION: 92 MS
EKG QTC CALCULATION (BAZETT): 446 MS
EKG R AXIS: 53 DEGREES
EKG T AXIS: 42 DEGREES
EKG VENTRICULAR RATE: 106 BPM

## 2022-11-12 PROCEDURE — 93010 ELECTROCARDIOGRAM REPORT: CPT | Performed by: INTERNAL MEDICINE

## 2022-11-12 SDOH — HEALTH STABILITY: PHYSICAL HEALTH: ON AVERAGE, HOW MANY MINUTES DO YOU ENGAGE IN EXERCISE AT THIS LEVEL?: 0 MIN

## 2022-11-12 SDOH — HEALTH STABILITY: PHYSICAL HEALTH: ON AVERAGE, HOW MANY DAYS PER WEEK DO YOU ENGAGE IN MODERATE TO STRENUOUS EXERCISE (LIKE A BRISK WALK)?: 0 DAYS

## 2022-11-12 NOTE — PROGRESS NOTES
WAQAR [facility] 74 [facility]  [address] [facility], [facility] [address]  Phone: [phone]      Name:  Lexx Bains  :    2000    Consultants:   Patient Care Team:  Lavern Martinez MD as PCP - General (Family Medicine)    Chief Complaint:     Lexx Bains is a 25 y.o. female who presents today for a New Patient care visit with Personalized Prevention Plan Services as noted below. HPI:     Lexx Bains is a 25 y.o. female who presents today to establish care. Back Pain  Recently seen in ED on 11/10/2022 for left-sided thoracic pain behind her shoulder that wraps around over her ribs to the front of her chest  - ED work-up: EKG sinus tachycardia with possible left atrial enlargement. CXR without evidence of any cardiomegaly. Labs evaluated with a leukocytosis 12.2 (sick with strep). No elevation in troponin or D-dimer.   - Discharged with lidocaine patch and naproxen 500 mg tablet BID prn  - Patient is to follow-up with cardiology  - Initially started in May 2021 and was intermittent. Pain typically right when the patient wakes up and is at times triggered by food. Patient typically eats out, so unclear if there are certain foods that have exacerbated it in past  - Patient denies every having RUQ pain  - Over last 3 months, pain has been occurring more often  - Pain occurs randomly, doesn't last too long and typically resolves with laying down. Reports that it is a shoulder/chest pain and a 7 out of 10  - At times, patient reports having a very sharp pain that lasts 15-20 min in the same area resolving with rest, rated pain as 9 out of 10.  Most recent occurrence was a couple weeks ago  - Patient denies any headache, dizziness, shortness of breath, palpitations, nausea, vomiting, or diarrhea with these episodes  - Patient works as a  at [employer] and is right-handed, holds the tray with her right arm and places dishes down with her left arm    Palpitations  - Patient reports usually occurring when laying down, has not happened when she's active, or at least that she's notices  - Has been going on for \"a while, at least a few years\"  - Palpitations occur a few times a month and she'll take deep breaths to calm down with resolution in ~5 min  - Patient does report feeling anxious with those episodes and does not report any inciting events as she's usually laying down  - JOHNNY-7: 2    Elevated BP without diagnosis of HTN  BP Readings from Last 3 Encounters:   11/14/22 126/80   11/10/22 (!) 159/85   02/08/22 (!) 129/91   - Elevated BPs have been when patient is in the ED, likely high in the acute setting    Obesity   Wt Readings from Last 5 Encounters:   11/14/22 149 lb (67.6 kg)   11/10/22 130 lb (59 kg)   02/08/22 130 lb (59 kg)   05/09/21 130 lb (59 kg)   05/02/19 120 lb (54.4 kg) (41 %, Z= -0.24)*     * Growth percentiles are based on CDC (Girls, 2-20 Years) data. - Patient reports that she uses to be fairly skinny and started gaining weight after starting an OCP in her teens.  - Notably, patient has had weight gain, but unsure of the accuracy of time of weight gain as above  - Diet: \"not that good right now\" doesn't buy groceries and doesn't cook because she doesn't know how. She typically eats at work, or eats out. Patient does have time to cook  - Activity: don't have a workout routine, walks a ton at work as a   - Obesity with family hx of CVD in female < 60 yo, duet for lipid screen    Headaches, Visual Disturbances  - [place]y [place]20[place] [facility] [place]  - At that time reported: HA typically 1-2x/month and mild. But since earlier this year, starting seeing \"light trails. \" Had reports of blurred vision, floaters, and flashes in eyes, was evaluated at [facility] and reportedly normal examination. Was prescribed glasses doesn't wear.  Had denied any weakness, numbness, ataxia, falls, seizures, or change in bowel and bladder  - Brain MRI 7/28/2022 ([facility]): No acute ischemia or intracranial hemorrhage. No pathologic parenchymal or meningeal enhancement. - Thinks symptoms started after getting COVID vaccine this year  - Notes symptoms that persist are the \"light trails, after images, and floaters\" are just [facility]" and bad at night. Discharge of Left Eye  - Patient reports waking with some watery discharge this morning and slight eye pain  - This is sudden onset, denies any blurring of her vision or pain with EOM    Women's Health  - Followed with [facility]  - Started on [facility] in 2017 (14 yo) for acne concerns and prevention of pregnancy  - Patient stopped it a couple months ago as she was \"getting bad at taking it\" and felt like it was messing up her periods. She reports acne coming back slightly, but is using an OTC regimen  - Age at Menarche: 6 yo  - LMP: 11/12/2022, current  - Duration of Menses: 6 days (off OCP, was lighter when on)  - Denies dysmenorrhea  - Sanitary Product: pads/tampons interchangeably  - Menstrual flow: Starts heavy, 3x a day a regular, then moderate, the \"almost nothing\"  - Number Pads/Tampons per day:regular tampons, changes every 4 hours. Wears pads at night and when bleeding is very light. - Partners: 2.   - Currently living with her boyfriend, Tannre. Feels safe in relationship  - Past vaginal infections: yeast infection  - Past STIs: none  - Pap smear: never done  - Pregnancies: [address] Maintenance  - HIV and Hep C Screen  Patient agreeable to most vaccines, but declines today due to recent ill with strep throat and finishing abx  - HPV Vaccine: possible initial dose 8/17/2018.  Due for next 2 doses   - Flu vaccine due  - Covid booster eligible, hesitant due to thought of correlation with above symptoms  - Tdap due since August    Patient Active Problem List   Diagnosis    Family history of early CAD Chronic left-sided thoracic back pain    Palpitations    Class 1 obesity without serious comorbidity in adult    Visual disturbances       Past Medical History:    Past Medical History:   Diagnosis Date    Asthma        Past Surgical History:  Past Surgical History:   Procedure Laterality Date    ADENOIDECTOMY  2004       Home Meds:  Prior to Visit Medications    Medication Sig Taking?  Authorizing Provider   azithromycin (ZITHROMAX) 250 MG tablet  Yes Historical Provider, MD   lidocaine 4 % external patch Place 1 patch onto the skin daily  Patient not taking: Reported on 11/14/2022  Sarah Yoder PA-C   naproxen (NAPROSYN) 500 MG tablet Take 1 tablet by mouth 2 times daily for 20 doses  Patient not taking: Reported on 11/14/2022  Sarah Yoder PA-C   ondansetron (ZOFRAN ODT) 4 MG disintegrating tablet Take 1 tablet by mouth every 8 hours as needed for Nausea  Patient not taking: Reported on 11/14/2022  ANTIONETTE Smart CNP       Allergies:    Motrin [ibuprofen micronized], Clavulanic acid, Ibuprofen, and Penicillins    Family History:       Problem Relation Age of Onset    Alcohol Abuse Mother     Diabetes Maternal Aunt     Hypothyroidism Maternal Aunt     High Blood Pressure Maternal Aunt     Heart Attack Maternal Aunt         Mid-50s    Stroke Maternal Uncle     High Cholesterol Maternal Grandfather     Heart Disease Maternal Grandfather        Social History:  Social History       Tobacco History       Smoking Status  Never      Smokeless Tobacco Use  Never              Alcohol History       Alcohol Use Status  No              Drug Use       Drug Use Status  Not Currently              Sexual Activity       Sexually Active  Yes Partners  Male                       Health Maintenance Completed:  Health Maintenance   Topic Date Due    HIV screen  Never done    HPV vaccine (2 - 3-dose series) 08/17/2018    Pap smear  Never done    COVID-19 Vaccine (3 - Booster for Pfizer series) 05/17/2022    Flu vaccine (1) 08/01/2022    DTaP/Tdap/Td vaccine (7 - Td or Tdap) 08/15/2022    Chlamydia/GC screen  12/02/2022    Depression Screen  11/14/2023    Hepatitis A vaccine  Completed    Hib vaccine  Completed    Varicella vaccine  Completed    Meningococcal (ACWY) vaccine  Completed    Hepatitis C screen  Completed    Pneumococcal 0-64 years Vaccine  Aged [employer] History   Administered Date(s) Administered    COVID-19, PFIZER GRAY top, DO NOT Dilute, (age 15 y+), IM, 30 mcg/0.3 mL 02/22/2022, 03/22/2022    DTaP (Infanrix) 2001, 2001, 2001    DTaP vaccine 07/24/2002, 09/21/2005    HIB PRP-T (ActHIB, Hiberix) 2001, 2001, 2001    HPV Quadrivalent (Gardasil) 07/20/2018    Hepatitis A Ped/Adol (Havrix, Vaqta) 08/15/2012, 09/22/2014    Hepatitis B 2001, 2001, 2001    Hib (HbOC) 07/24/2002    Influenza Virus Vaccine 10/09/2004, 10/09/2004, 10/30/2008, 10/30/2008, 10/14/2011, 10/14/2011    MMR 11/15/2002, 09/21/2005    Meningococcal B, OMV (Bexsero) 07/20/2018    Meningococcal MCV4P (Menactra) 08/15/2012, 07/20/2018    Pneumococcal Conjugate 7-valent (Dewane Purpura) 2001, 2001, 2001, 03/19/2002    Polio IPV (IPOL) 2001, 2001, 07/24/2002, 09/21/2005    Tdap (Boostrix, Adacel) 08/15/2012    Varicella (Varivax) 07/24/2002, 08/11/2006         Review of Systems:  Review of Systems   Constitutional:  Positive for unexpected weight change. Negative for chills and fever. HENT:  Negative for congestion and sore throat. Eyes:  Positive for discharge and visual disturbance. Respiratory:  Negative for cough and shortness of breath. Cardiovascular:  Positive for chest pain and palpitations. Gastrointestinal:  Negative for abdominal pain, constipation, diarrhea, nausea and vomiting. Genitourinary:  Negative for difficulty urinating and dysuria. Musculoskeletal:  Positive for back pain.    Neurological:  Negative for dizziness, light-headedness and headaches. Psychiatric/Behavioral:  Negative for dysphoric mood. The patient is not nervous/anxious. Physical Exam:   Vitals:    11/14/22 0857   BP: 126/80   Site: Left Upper Arm   Position: Sitting   Cuff Size: Medium Adult   Pulse: 96   Temp: 97.9 °F (36.6 °C)   TempSrc: Temporal   SpO2: 99%   Weight: 149 lb (67.6 kg)   Height: 4' 11\" (1.499 m)     Body mass index is 30.09 kg/m². Wt Readings from Last 3 Encounters:   11/14/22 149 lb (67.6 kg)   11/10/22 130 lb (59 kg)   02/08/22 130 lb (59 kg)       BP Readings from Last 3 Encounters:   11/14/22 126/80   11/10/22 (!) 159/85   02/08/22 (!) 129/91       Physical Exam  Constitutional:       General: She is not in acute distress. Appearance: She is obese. HENT:      Head: Normocephalic and atraumatic. Right Ear: Tympanic membrane normal.      Left Ear: Tympanic membrane normal.      Nose: Nose normal.      Mouth/Throat:      Mouth: Mucous membranes are moist.      Pharynx: Oropharynx is clear. Eyes:      Extraocular Movements: Extraocular movements intact. Conjunctiva/sclera: Conjunctivae normal.      Pupils: Pupils are equal, round, and reactive to light. Cardiovascular:      Rate and Rhythm: Normal rate and regular rhythm. Pulses: Normal pulses. Heart sounds: Normal heart sounds. No murmur heard. No friction rub. No gallop. Pulmonary:      Effort: Pulmonary effort is normal. No respiratory distress. Breath sounds: Normal breath sounds. No wheezing, rhonchi or rales. Abdominal:      General: There is no distension. Palpations: Abdomen is soft. Tenderness: There is no abdominal tenderness. There is no guarding. Hernia: No hernia is present. Musculoskeletal:         General: Normal range of motion. Right shoulder: Normal.      Left shoulder: No swelling, tenderness or bony tenderness. Normal range of motion. Cervical back: Normal range of motion.       Thoracic back: No swelling, deformity, tenderness or bony tenderness. Skin:     General: Skin is warm and dry. Neurological:      General: No focal deficit present. Mental Status: She is alert and oriented to person, place, and time. Psychiatric:         Mood and Affect: Mood normal.         Behavior: Behavior normal.        PHQ Scores 11/14/2022   PHQ2 Score 0   PHQ9 Score 5     Interpretation of Total Score Depression Severity: 1-4 = Minimal depression, 5-9 = Mild depression, 10-14 = Moderate depression, 15-19 = Moderately severe depression, 20-27 = Severe depression  JOHNNY 7 SCORE 11/14/2022   JOHNNY-7 Total Score 2     Interpretation of JOHNNY-7 score: 5-9 = mild anxiety, 10-14 = moderate anxiety, 15+ = severe anxiety. Recommend referral to behavioral health for scores 10 or greater.       Lab Review:   Admission on 11/10/2022, Discharged on 11/10/2022   Component Date Value    Ventricular Rate 11/10/2022 106     Atrial Rate 11/10/2022 106     P-R Interval 11/10/2022 160     QRS Duration 11/10/2022 92     Q-T Interval 11/10/2022 336     QTc Calculation (Bazett) 11/10/2022 446     P Axis 11/10/2022 59     R Axis 11/10/2022 53     T Axis 11/10/2022 42     Diagnosis 11/10/2022 Sinus tachycardiaPossible Left atrial enlargementBorderline ECGNo previous ECGs availableConfirmed by Mckenzie Doctor MD, Dwayne Crow ([id number]) on 11/12/2022 2:26:06 PM     WBC 11/10/2022 12.2 (A)     RBC 11/10/2022 4.69     Hemoglobin 11/10/2022 14.1     Hematocrit 11/10/2022 41.8     MCV 11/10/2022 89.0     MCH 11/10/2022 30.0     MCHC 11/10/2022 33.7     RDW 11/10/2022 12.3 (A)     Platelets 42/67/9456 289     MPV 11/10/2022 8.2     Neutrophils % 11/10/2022 70.9     Lymphocytes % 11/10/2022 19.7     Monocytes % 11/10/2022 7.0     Eosinophils % 11/10/2022 1.8     Basophils % 11/10/2022 0.6     Neutrophils Absolute 11/10/2022 8.7 (A)     Lymphocytes Absolute 11/10/2022 2.4     Monocytes Absolute 11/10/2022 0.9     Eosinophils Absolute 11/10/2022 0.2     Basophils Absolute 11/10/2022 0.1     Sodium 11/10/2022 135 (A)     Potassium reflex Magnesi* 11/10/2022 3.4 (A)     Chloride 11/10/2022 100     CO2 11/10/2022 25     Anion Gap 11/10/2022 10     Glucose 11/10/2022 107 (A)     BUN 11/10/2022 7     Creatinine 11/10/2022 0.6     Est, Glom Filt Rate 11/10/2022 >60     Calcium 11/10/2022 9.3     Total Protein 11/10/2022 8.0     Albumin 11/10/2022 4.7     Albumin/Globulin Ratio 11/10/2022 1.4     Total Bilirubin 11/10/2022 0.3     Alkaline Phosphatase 11/10/2022 88     ALT 11/10/2022 17     AST 11/10/2022 17     Troponin 11/10/2022 <0.01     hCG Qual 11/10/2022 Negative     D-Dimer, Quant 11/10/2022 0.40     Magnesium 11/10/2022 2.00           Assessment/Plan:  Octavio Love is a 25 y.o. female who presents today to establish care. Encounter to establish care  Patient's medications, allergies, past medical, surgical, social and family histories were reviewed and updated as appropriate. Chronic left-sided thoracic back pain  - Not at goal  - Ddx: musculoskeletal pain vs cholelithiasis vs cardiac in nature  - Less concern for MSK pain without reproducibility on exam  - Patient has never had complaints over RUQ pain that could be concerning for referred pain cholelithiasis.  Will defer RUQ US at this time in favor of monitoring symptoms further  - Unclear if cardiac association, but patient will be seeing cardiology soon to discuss   - Patient instructed to keep diary of symptoms, when they occur, associations with food, and the severity  - May use naproxen prn  - Follow-up prn    Palpitations  Abnormal EKG  - Not at goal  - Ddx: arrhythmia vs structural cardiac defect vs substance use vs thyroid disease  - Will order TSH to evaluate for thyroid disease  - Cardiac event monitor for symptoms only occurring a few times a month, may capture arrhythmia  - Patient scheduled with cardiology, will defer to them whether they would like an echo  - Patient denied any substance use today  -     TSH with Reflex; Future  -     Cardiac event monitor; Future    Visual disturbances  - Not at goal  - Negative work-up to date with neuro and eye doctor  - Patient advised to continue monitoring her symptoms and return for any changes or worsening in symptoms    Class 1 obesity without serious comorbidity in adult, unspecified BMI, unspecified obesity type  Family history of early CAD  - Not at goal  - Encouraged low fat diet, weight reduction, and increase in exercise regimen.  - Recommend 150 minutes of cardiovascular activity a week, or 10,000 to 15,000 steps a day, 2 days of weightbearing  - Dietary wise, try to stick to your weight x 10 in calories a day  - Avoid processed/refined carbohydrates (boxed/canned/frozen/fast)  - Encourage healthy protein and fat, butter, avocado, egg  - Advised patient to use [employer] as platform to search various creators who are food-based and work with easy/healthy meals  - Lipid and diabetes screen today. Lipids especially due to fam hx of early CAD in female relative  -     Lipid Panel; Future  -     Hemoglobin A1C; Future    Discharge of Left Eye  - Likely viral or allergic conjunctivitis due to watery nature of discharge  - If patient's symptoms progress or worsen, such as redness to her eye, change to a purulent discharge, or pain with eye movements, she may let us know on [address] maintenance  - Due for Pap smear, will follow-up in 1 month for well woman exam  -     HIV Screen; Future  -     Hepatitis C Antibody; Future    Refused influenza vaccination  Need for vaccination  Due to getting over strep today, patient declines vaccines but agreeable to most in the future except where stated  - HPV Vaccine: Due for next 2 doses   - Flu vaccine due  - Covid booster eligible, hesitant due to thought of correlation with visual disturbances.  Will educate patient at follow-up this is unlikely and just a conincidence  - Tdap due      Health Maintenance Due:  Health Maintenance Due Topic Date Due    HIV screen  Never done    HPV vaccine (2 - 3-dose series) 08/17/2018    Pap smear  Never done    COVID-19 Vaccine (3 - Booster for Pfizer series) 05/17/2022    Flu vaccine (1) 08/01/2022    DTaP/Tdap/Td vaccine (7 - Td or Tdap) 08/15/2022    Chlamydia/GC screen  12/02/2022        Health care decision maker:  <72years old      Health Maintenance: (USPSTF Recommendations)  (F) Cervical Cancer Screen: (21-29 q3yr cytology alone; 30-65 q3yr cytology alone, q5yr with hrHPV alone, or q5yr cytology+hrHPV (A)): due now  HIV Screen: (16-65 yr old, and all pregnant patients (A)): ordered today  Hep C Screen: (21-70 yr old (B)): ordered today  [address] Screen: (all pts with cirrhosis and high risk Hep B (US q6 mo)):  Immunizations: [unfilled]      RTC:  Return in about 1 month (around 12/14/2022) for follow-up pap smear, HPV vaccine and other vaccines. EMR Dragon/transcription disclaimer:  Much of this encounter note is electronic transcription/translation of spoken language to printed texts. The electronic translation of spoken language may be erroneous, or at times, nonsensical words or phrases may be inadvertently transcribed.   Although I have reviewed the note for such errors, some may still exist.     Leslie Garcia MD  PGY-2  11/14/2022

## 2022-11-14 ENCOUNTER — OFFICE VISIT (OUTPATIENT)
Dept: PRIMARY CARE CLINIC | Age: 22
End: 2022-11-14
Payer: MEDICAID

## 2022-11-14 ENCOUNTER — HOSPITAL ENCOUNTER (OUTPATIENT)
Age: 22
Discharge: HOME | End: 2022-11-14
Payer: MEDICAID

## 2022-11-14 VITALS
HEIGHT: 59 IN | OXYGEN SATURATION: 99 % | DIASTOLIC BLOOD PRESSURE: 80 MMHG | HEART RATE: 96 BPM | WEIGHT: 149 LBS | BODY MASS INDEX: 30.04 KG/M2 | SYSTOLIC BLOOD PRESSURE: 126 MMHG | TEMPERATURE: 97.9 F

## 2022-11-14 DIAGNOSIS — Z23 NEED FOR VACCINATION: ICD-10-CM

## 2022-11-14 DIAGNOSIS — M54.6 CHRONIC LEFT-SIDED THORACIC BACK PAIN: ICD-10-CM

## 2022-11-14 DIAGNOSIS — H57.89 DISCHARGE OF LEFT EYE: ICD-10-CM

## 2022-11-14 DIAGNOSIS — Z00.00 HEALTHCARE MAINTENANCE: ICD-10-CM

## 2022-11-14 DIAGNOSIS — Z28.21 REFUSED INFLUENZA VACCINE: ICD-10-CM

## 2022-11-14 DIAGNOSIS — E66.9 CLASS 1 OBESITY WITHOUT SERIOUS COMORBIDITY IN ADULT, UNSPECIFIED BMI, UNSPECIFIED OBESITY TYPE: ICD-10-CM

## 2022-11-14 DIAGNOSIS — R94.31 ABNORMAL EKG: ICD-10-CM

## 2022-11-14 DIAGNOSIS — Z76.89 ENCOUNTER TO ESTABLISH CARE: Primary | ICD-10-CM

## 2022-11-14 DIAGNOSIS — H53.9 VISUAL DISTURBANCES: ICD-10-CM

## 2022-11-14 DIAGNOSIS — R00.2 PALPITATIONS: ICD-10-CM

## 2022-11-14 DIAGNOSIS — Z82.49 FAMILY HISTORY OF EARLY CAD: ICD-10-CM

## 2022-11-14 DIAGNOSIS — G89.29 CHRONIC LEFT-SIDED THORACIC BACK PAIN: ICD-10-CM

## 2022-11-14 LAB
CHOLESTEROL, TOTAL: 168 MG/DL (ref 0–199)
HDLC SERPL-MCNC: 36 MG/DL (ref 40–60)
HEPATITIS C ANTIBODY INTERPRETATION: NORMAL
LDL CHOLESTEROL CALCULATED: 118 MG/DL
TRIGL SERPL-MCNC: 72 MG/DL (ref 0–150)
TSH REFLEX: 2.78 UIU/ML (ref 0.27–4.2)
VLDLC SERPL CALC-MCNC: 14 MG/DL

## 2022-11-14 PROCEDURE — 86701 HIV-1ANTIBODY: CPT

## 2022-11-14 PROCEDURE — 84443 ASSAY THYROID STIM HORMONE: CPT

## 2022-11-14 PROCEDURE — 87389 HIV-1 AG W/HIV-1&-2 AB AG IA: CPT

## 2022-11-14 PROCEDURE — 83036 HEMOGLOBIN GLYCOSYLATED A1C: CPT

## 2022-11-14 PROCEDURE — 1036F TOBACCO NON-USER: CPT

## 2022-11-14 PROCEDURE — 86702 HIV-2 ANTIBODY: CPT

## 2022-11-14 PROCEDURE — 99204 OFFICE O/P NEW MOD 45 MIN: CPT

## 2022-11-14 PROCEDURE — G8484 FLU IMMUNIZE NO ADMIN: HCPCS

## 2022-11-14 PROCEDURE — 36415 COLL VENOUS BLD VENIPUNCTURE: CPT

## 2022-11-14 PROCEDURE — G8427 DOCREV CUR MEDS BY ELIG CLIN: HCPCS

## 2022-11-14 PROCEDURE — 80061 LIPID PANEL: CPT

## 2022-11-14 PROCEDURE — G8417 CALC BMI ABV UP PARAM F/U: HCPCS

## 2022-11-14 PROCEDURE — 87390 HIV-1 AG IA: CPT

## 2022-11-14 PROCEDURE — 86803 HEPATITIS C AB TEST: CPT

## 2022-11-14 RX ORDER — AZITHROMYCIN 250 MG/1
TABLET, FILM COATED ORAL
COMMUNITY
Start: 2022-10-09 | End: 2022-11-23

## 2022-11-14 SDOH — ECONOMIC STABILITY: FOOD INSECURITY: WITHIN THE PAST 12 MONTHS, THE FOOD YOU BOUGHT JUST DIDN'T LAST AND YOU DIDN'T HAVE MONEY TO GET MORE.: NEVER TRUE

## 2022-11-14 SDOH — ECONOMIC STABILITY: FOOD INSECURITY: WITHIN THE PAST 12 MONTHS, YOU WORRIED THAT YOUR FOOD WOULD RUN OUT BEFORE YOU GOT MONEY TO BUY MORE.: NEVER TRUE

## 2022-11-14 ASSESSMENT — PATIENT HEALTH QUESTIONNAIRE - PHQ9
6. FEELING BAD ABOUT YOURSELF - OR THAT YOU ARE A FAILURE OR HAVE LET YOURSELF OR YOUR FAMILY DOWN: 0
SUM OF ALL RESPONSES TO PHQ9 QUESTIONS 1 & 2: 0
8. MOVING OR SPEAKING SO SLOWLY THAT OTHER PEOPLE COULD HAVE NOTICED. OR THE OPPOSITE, BEING SO FIGETY OR RESTLESS THAT YOU HAVE BEEN MOVING AROUND A LOT MORE THAN USUAL: 0
3. TROUBLE FALLING OR STAYING ASLEEP: 2
4. FEELING TIRED OR HAVING LITTLE ENERGY: 2
7. TROUBLE CONCENTRATING ON THINGS, SUCH AS READING THE NEWSPAPER OR WATCHING TELEVISION: 0
9. THOUGHTS THAT YOU WOULD BE BETTER OFF DEAD, OR OF HURTING YOURSELF: 0
1. LITTLE INTEREST OR PLEASURE IN DOING THINGS: 0
SUM OF ALL RESPONSES TO PHQ QUESTIONS 1-9: 5
10. IF YOU CHECKED OFF ANY PROBLEMS, HOW DIFFICULT HAVE THESE PROBLEMS MADE IT FOR YOU TO DO YOUR WORK, TAKE CARE OF THINGS AT HOME, OR GET ALONG WITH OTHER PEOPLE: 0
2. FEELING DOWN, DEPRESSED OR HOPELESS: 0
5. POOR APPETITE OR OVEREATING: 1
SUM OF ALL RESPONSES TO PHQ QUESTIONS 1-9: 5

## 2022-11-14 ASSESSMENT — ANXIETY QUESTIONNAIRES
5. BEING SO RESTLESS THAT IT IS HARD TO SIT STILL: 0
GAD7 TOTAL SCORE: 2
6. BECOMING EASILY ANNOYED OR IRRITABLE: 0
2. NOT BEING ABLE TO STOP OR CONTROL WORRYING: 0
IF YOU CHECKED OFF ANY PROBLEMS ON THIS QUESTIONNAIRE, HOW DIFFICULT HAVE THESE PROBLEMS MADE IT FOR YOU TO DO YOUR WORK, TAKE CARE OF THINGS AT HOME, OR GET ALONG WITH OTHER PEOPLE: NOT DIFFICULT AT ALL
4. TROUBLE RELAXING: 0
3. WORRYING TOO MUCH ABOUT DIFFERENT THINGS: 1
7. FEELING AFRAID AS IF SOMETHING AWFUL MIGHT HAPPEN: 0
1. FEELING NERVOUS, ANXIOUS, OR ON EDGE: 1

## 2022-11-14 ASSESSMENT — SOCIAL DETERMINANTS OF HEALTH (SDOH): HOW HARD IS IT FOR YOU TO PAY FOR THE VERY BASICS LIKE FOOD, HOUSING, MEDICAL CARE, AND HEATING?: NOT VERY HARD

## 2022-11-14 NOTE — PATIENT INSTRUCTIONS
Please consider getting your COVID Booster, may schedule with our clinic on Wednesday afternoons or with your pharmacy. This could be done with a  flu vaccine in the future. Please let us know if your back pain changes, is on the right shoulder, or every right upper abdominal pain. We would be concerned about gall bladder at that point.

## 2022-11-15 ENCOUNTER — OFFICE VISIT (OUTPATIENT)
Dept: CARDIOLOGY CLINIC | Age: 22
End: 2022-11-15
Payer: MEDICAID

## 2022-11-15 VITALS
OXYGEN SATURATION: 97 % | SYSTOLIC BLOOD PRESSURE: 120 MMHG | HEIGHT: 59 IN | DIASTOLIC BLOOD PRESSURE: 72 MMHG | HEART RATE: 92 BPM | WEIGHT: 147 LBS | BODY MASS INDEX: 29.64 KG/M2

## 2022-11-15 DIAGNOSIS — R94.31 EKG ABNORMALITIES: Primary | ICD-10-CM

## 2022-11-15 DIAGNOSIS — R00.0 TACHYCARDIA: ICD-10-CM

## 2022-11-15 PROBLEM — H53.9 VISUAL DISTURBANCES: Status: ACTIVE | Noted: 2022-11-15

## 2022-11-15 PROBLEM — M54.6 CHRONIC LEFT-SIDED THORACIC BACK PAIN: Status: ACTIVE | Noted: 2022-11-15

## 2022-11-15 PROBLEM — G89.29 CHRONIC LEFT-SIDED THORACIC BACK PAIN: Status: ACTIVE | Noted: 2022-11-15

## 2022-11-15 PROBLEM — E66.811 CLASS 1 OBESITY WITHOUT SERIOUS COMORBIDITY IN ADULT: Status: ACTIVE | Noted: 2022-11-15

## 2022-11-15 PROBLEM — R00.2 PALPITATIONS: Status: ACTIVE | Noted: 2022-11-15

## 2022-11-15 PROBLEM — E66.9 CLASS 1 OBESITY WITHOUT SERIOUS COMORBIDITY IN ADULT: Status: ACTIVE | Noted: 2022-11-15

## 2022-11-15 LAB
ESTIMATED AVERAGE GLUCOSE: 93.9 MG/DL
HBA1C MFR BLD: 4.9 %

## 2022-11-15 PROCEDURE — 1036F TOBACCO NON-USER: CPT | Performed by: INTERNAL MEDICINE

## 2022-11-15 PROCEDURE — 99204 OFFICE O/P NEW MOD 45 MIN: CPT | Performed by: INTERNAL MEDICINE

## 2022-11-15 PROCEDURE — G8417 CALC BMI ABV UP PARAM F/U: HCPCS | Performed by: INTERNAL MEDICINE

## 2022-11-15 PROCEDURE — G8427 DOCREV CUR MEDS BY ELIG CLIN: HCPCS | Performed by: INTERNAL MEDICINE

## 2022-11-15 PROCEDURE — G8484 FLU IMMUNIZE NO ADMIN: HCPCS | Performed by: INTERNAL MEDICINE

## 2022-11-15 RX ORDER — CEPHALEXIN 500 MG/1
500 CAPSULE ORAL 2 TIMES DAILY
COMMUNITY
End: 2022-11-23

## 2022-11-15 ASSESSMENT — ENCOUNTER SYMPTOMS
EYE DISCHARGE: 1
DIARRHEA: 0
NAUSEA: 0
SORE THROAT: 0
CONSTIPATION: 0
COUGH: 0
VOMITING: 0
BACK PAIN: 1
ABDOMINAL PAIN: 0
SHORTNESS OF BREATH: 0

## 2022-11-15 NOTE — PATIENT INSTRUCTIONS
PLAN:  Echo to evaluate for heart sized and function  Stress myoview to evaluate for heart strength  Call [phone] to schedule echo and stress  2 week cardiac monitor for tachycardia  BMP CBC Urine pregnancy  Follow up with NP 3 months

## 2022-11-15 NOTE — PROGRESS NOTES
[address] [facility]  [phone]      Attending Physician: Josie Gates MD     Reason for Consultation/Chief Complaint: New patient, tachycardia    Subjective   History of Present Illness:  Terese Bravo is a 25 y.o. patient who presents today as a new patient referral from [facility] for further evaluation of sinus tachycardia. Today she presents with her mother. She states that she has been having back/shoulder pain that radiates to her front. She states that she gets a racing heart feeling a lot and feels this more when she lays flat. She states that she works a lot as a  and is active with the job, but does not do any kind of exercise program. Her mother reports that cardiac problems do run in the family. She has not had any changes in medications. She does not smoke or drink alcohol. She denies chest pain, dizziness, syncope and edema. Notes sob/fatigue. Past Medical History:   has a past medical history of Asthma. Surgical History:   has a past surgical history that includes Adenoidectomy (2004). Social History:   reports that she has never smoked. She has never used smokeless tobacco. She reports that she does not currently use drugs. She reports that she does not drink alcohol. Family History:  family history includes Alcohol Abuse in her mother; Diabetes in her maternal aunt; Heart Attack in her maternal aunt; Heart Disease in her maternal grandfather; High Blood Pressure in her maternal aunt; High Cholesterol in her maternal grandfather; Hypothyroidism in her maternal aunt; Stroke in her maternal uncle. Home Medications:  Were reviewed and are listed in nursing record and/or below  Prior to Admission medications    Medication Sig Start Date End Date Taking?  Authorizing Provider   cephALEXin (KEFLEX) 500 MG capsule Take 500 mg by mouth 2 times daily   Yes Historical Provider, MD   azithromycin (ZITHROMAX) 250 MG tablet  10/9/22 Historical Provider, MD   lidocaine 4 % external patch Place 1 patch onto the skin daily  Patient not taking: No sig reported 11/10/22 12/10/22  Sarah Yoder PA-C   naproxen (NAPROSYN) 500 MG tablet Take 1 tablet by mouth 2 times daily for 20 doses  Patient not taking: No sig reported 11/10/22 11/20/22  Sarah Yoder PA-C   ondansetron (ZOFRAN ODT) 4 MG disintegrating tablet Take 1 tablet by mouth every 8 hours as needed for Nausea  Patient not taking: No sig reported 2/8/22   ANTIONETTE Hawkins CNP        CURRENT Medications:  No current facility-administered medications for this visit. Allergies:  Motrin [ibuprofen micronized], Clavulanic acid, Ibuprofen, and Penicillins     Review of Systems:   A 14 point review of symptoms completed. Pertinent positives identified in the HPI, all other review of symptoms negative as below. Objective   PHYSICAL EXAM:    Vitals:    11/15/22 1332   BP:    Pulse: 92   SpO2:     Weight: 147 lb (66.7 kg)       Gen: Patient in NAD, resting comfortably  Neck: No JVD or bruits  Respiratory: CTAB no WRR  Chest: normal without deformity  Cardiovascular:RRR, S1S2, borderline, tachy, no mrg, normal PMI  Abdomen: Soft, NTND, Normal BS  Extremities: No clubbing, cyanosis, or edema  Neurological/Psychiatric:  AxO x4, No gross motors/sensory deficits  Skin:  Warm and dry      Labs   CBC:   Lab Results   Component Value Date/Time    WBC 12.2 11/10/2022 08:35 PM    RBC 4.69 11/10/2022 08:35 PM    HGB 14.1 11/10/2022 08:35 PM    HCT 41.8 11/10/2022 08:35 PM    MCV 89.0 11/10/2022 08:35 PM    RDW 12.3 11/10/2022 08:35 PM     11/10/2022 08:35 PM     CMP:  Lab Results   Component Value Date/Time     11/10/2022 08:35 PM    K 3.4 11/10/2022 08:35 PM     11/10/2022 08:35 PM    CO2 25 11/10/2022 08:35 PM    BUN 7 11/10/2022 08:35 PM    CREATININE 0.6 11/10/2022 08:35 PM    GFRAA >60 02/08/2022 10:04 PM    AGRATIO 1.4 11/10/2022 08:35 PM    LABGLOM >60 11/10/2022 08:35 PM GLUCOSE 107 11/10/2022 08:35 PM    PROT 8.0 11/10/2022 08:35 PM    CALCIUM 9.3 11/10/2022 08:35 PM    BILITOT 0.3 11/10/2022 08:35 PM    ALKPHOS 88 11/10/2022 08:35 PM    AST 17 11/10/2022 08:35 PM    ALT 17 11/10/2022 08:35 PM     PT/INR:  No results found for: PTINR  HgBA1c:  Lab Results   Component Value Date    LABA1C 4.9 2022     Lab Results   Component Value Date    TROPONINI <0.01 11/10/2022         Cardiac Data     Last EK/10/22  Sinus tacycardia, possible left atrial enlargement,     Echo:    Stress Test:    Cath:    Studies:       I have reviewed labs and imaging/xray/diagnostic testing in this note. Assessment and Plan      1. Tachycardia    Abnl ekg   sob    PLAN:  Echo to evaluate for heart sized and function, sob, abnl ekg   Exercise ekg stress test (no imaging required) to evaluate for heart strength, tachycardia, sob  Call [phone] to schedule echo and stress  2 week cardiac monitor for tachycardia  BMP CBC Urine pregnancy  Follow up with NP 3 months          Scribe's attestation: This note was scribed in the presence of Dr. Dane Monroy MD   by Patrick Gibson LPN    I, Dr Dane Monroy, personally performed the services described in this documentation, as scribed by the above signed scribe in my presence. It is both accurate and complete to my knowledge. I agree with the details independently gathered by the clinical support staff and the scribed note accurately describes my personal service to the patient. Thank you for allowing us to participate in the care of Jackie Burns. Please call me with any questions [phone].     Dane Monroy MD, [facility]   Interventional Cardiologist  [address]  [phone] [facility]  [phone] [address] [place] [address]  11/15/2022 1:41 PM

## 2022-11-17 LAB — MISCELLANEOUS LAB TEST ORDER: NORMAL

## 2022-11-22 ENCOUNTER — PATIENT MESSAGE (OUTPATIENT)
Dept: PRIMARY CARE CLINIC | Age: 22
End: 2022-11-22

## 2022-11-22 ENCOUNTER — NURSE TRIAGE (OUTPATIENT)
Dept: OTHER | Facility: CLINIC | Age: 22
End: 2022-11-22

## 2022-11-22 ASSESSMENT — ENCOUNTER SYMPTOMS
WHEEZING: 0
VOMITING: 0
EYE DISCHARGE: 0
RHINORRHEA: 0
EYE PAIN: 0
DIARRHEA: 0
NAUSEA: 0
COUGH: 0
CHEST TIGHTNESS: 0
SORE THROAT: 1
CONSTIPATION: 0
PHOTOPHOBIA: 0
ABDOMINAL PAIN: 0
BACK PAIN: 0

## 2022-11-22 NOTE — PROGRESS NOTES
[address] and [facility]                                             [address] [facility], [address] [place] [address], [facility] [address]        Phone: [phone]      Name:  Adela Hodges  :    2000    Consultants:   Patient Care Team:  Stew Arriaga MD as PCP - General (Family Medicine)    Chief Complaint:     Adela Hodges is a 25 y.o. female  who presents today for an established patient care visit with [address] [place] [address] as noted below. HPI:     Patient is a 25year old Female with past medical history of Asthma, back pain, sinus tachycardia, elevated BP without HTN, headaches. Patient presents in office today for acute visit of persistent congestion for >2 weeks duration, sore throat, and fatigue. Patient denies any chest pain, nausea, vomiting, or diarrhea. Upper Respiratory Infection vs. Acute Rhinosinusitis   Patient states that in 2022 she had positive rapid strep test.  Given patient's penicillin allergy she was started on azithromycin. Patient states that she had good clinical improvement afterwards. Patient states that [facility] 13 [facility] [place] [address] [facility] Patient states that she had positive rapid strep at that visit and was given Keflex given recurrence in less than 30 days. Pt successfully completed course with mild to moderate improvement in sx. Patient used Afrin nasal spray for 3 days without relief. Patient states that she continues to use OTC eyedrops and Mucinex. Patient continues to complain of eye itchiness, headache, sore throat, \"stuffy nose\" and congestion, chills, SOB/CONTRERAS, palpitations (recent dx of Sinus Tachy). Patient states that she has complaints of left eye conjunctivitis that seems more erythematous compared to right without purulent discharge at time of visit today. Patient denies any sinus pressure/pain. Patient states that she has difficulty popping her ears and equalizing pressure. Patient states that she has had chills occasionally but has been without fever. Sinus Tachycardia   Symptoms initially started when patient went to the ED on 11/10/2022 for low back pain that radiated to the chest on left side. EKG showed sinus tachycardia and chest x-ray was negative for acute pulmonary processes. D-dimer and troponins were negative. Patient was discharged on lidocaine patch and naproxen as needed for pain. At that time patient was noted to have history of intermittent palpitations for the last several years that usually self resolved in 5 minutes. Patient states that she saw Dr. Hilda Alvarado approximately 1 week ago (11/15/2022). Patient states that at that visit echo was ordered in addition to exercise stress test and 2-week cardiac monitor. Patient states that she needs to  cardiac monitor and will schedule echo and exercise stress test after Thanksgiving. BMP, CBC, urine pregnancy were ordered at the visit. Patient was scheduled to follow-up with cardiology in 3 months. Patient Active Problem List   Diagnosis    Family history of early CAD    Chronic left-sided thoracic back pain    Palpitations    Class 1 obesity without serious comorbidity in adult    Visual disturbances    Tachycardia    Acne vulgaris    Floaters in visual field, bilateral    Mass of wrist, left         Past Medical History:    Past Medical History:   Diagnosis Date    Asthma        Past Surgical History:  Past Surgical History:   Procedure Laterality Date    ADENOIDECTOMY  2004       Home Meds:  Prior to Visit Medications    Medication Sig Taking?  Authorizing Provider   moxifloxacin (VIGAMOX) 0.5 % ophthalmic solution Place 1 drop into the left eye 3 times daily for 7 days Yes Tamie Flores,    naproxen (NAPROSYN) 500 MG tablet Take 1 tablet by mouth 2 times daily for 20 doses  Patient taking differently: Take 500 mg by mouth 2 times daily as needed Yes Sarah Yoder PA-C       Allergies:    Motrin [ibuprofen micronized], Clavulanic acid, Ibuprofen, and Penicillins    Family History:       Problem Relation Age of Onset    Alcohol Abuse Mother     Diabetes Maternal Aunt     Hypothyroidism Maternal Aunt     High Blood Pressure Maternal Aunt     Heart Attack Maternal Aunt         Mid-50s    Stroke Maternal Uncle     High Cholesterol Maternal Grandfather     Heart Disease Maternal Grandfather          Health Maintenance Completed:  Health Maintenance   Topic Date Due    HIV screen  Never done    HPV vaccine (2 - 3-dose series) 08/17/2018    Pap smear  Never done    COVID-19 Vaccine (3 - Booster for Pfizer series) 05/17/2022    Flu vaccine (1) 08/01/2022    DTaP/Tdap/Td vaccine (7 - Td or Tdap) 08/15/2022    Chlamydia/GC screen  12/02/2022    Depression Screen  11/14/2023    Hepatitis A vaccine  Completed    Hib vaccine  Completed    Varicella vaccine  Completed    Meningococcal (ACWY) vaccine  Completed    Hepatitis C screen  Completed    Pneumococcal 0-64 years Vaccine  Aged [employer] History   Administered Date(s) Administered    COVID-19, PFIZER GRAY top, DO NOT Dilute, (age 15 y+), IM, 30 mcg/0.3 mL 02/22/2022, 03/22/2022    DTaP (Infanrix) 2001, 2001, 2001    DTaP vaccine 07/24/2002, 09/21/2005    HIB PRP-T (ActHIB, Hiberix) 2001, 2001, 2001    HPV Quadrivalent (Gardasil) 07/20/2018    Hepatitis A Ped/Adol (Havrix, Vaqta) 08/15/2012, 09/22/2014    Hepatitis B 2001, 2001, 2001    Hib (HbOC) 07/24/2002    Influenza Virus Vaccine 10/09/2004, 10/09/2004, 10/30/2008, 10/30/2008, 10/14/2011, 10/14/2011    MMR 11/15/2002, 09/21/2005    Meningococcal B, OMV (Bexsero) 07/20/2018    Meningococcal MCV4P (Menactra) 08/15/2012, 07/20/2018    Pneumococcal Conjugate 7-valent (Hardik Amaya) 2001, 2001, 2001, 03/19/2002    Polio IPV (IPOL) 2001, 2001, 07/24/2002, 09/21/2005    Tdap (Boostrix, Adacel) 08/15/2012    Varicella (Varivax) 07/24/2002, 08/11/2006         Review of Systems:  Review of Systems   Constitutional:  Positive for chills. Negative for diaphoresis, fatigue and fever. HENT:  Positive for congestion, postnasal drip and sore throat. Negative for rhinorrhea, sinus pressure, sinus pain, sneezing and tinnitus. Eyes:  Positive for redness and itching. Negative for photophobia, pain, discharge and visual disturbance. Respiratory:  Positive for shortness of breath. Negative for cough, chest tightness and wheezing. Cardiovascular:  Negative for chest pain and palpitations. Gastrointestinal:  Negative for abdominal pain, constipation, diarrhea, nausea and vomiting. Musculoskeletal:  Negative for arthralgias, back pain and myalgias. Skin: Negative. Neurological:  Negative for dizziness, tremors, seizures, syncope, weakness, light-headedness, numbness and headaches. Psychiatric/Behavioral: Negative. Physical Exam:   Vitals:    11/23/22 1040   BP: 130/82   Pulse: 90   Temp: 98.9 °F (37.2 °C)   SpO2: 99%   Weight: 148 lb 6.4 oz (67.3 kg)   Height: 4' 11\" (1.499 m)     Body mass index is 29.97 kg/m². Wt Readings from Last 3 Encounters:   11/23/22 148 lb 6.4 oz (67.3 kg)   11/15/22 147 lb (66.7 kg)   11/14/22 149 lb (67.6 kg)       BP Readings from Last 3 Encounters:   11/23/22 130/82   11/15/22 120/72   11/14/22 126/80       Physical Exam  Constitutional:       General: She is not in acute distress. Appearance: Normal appearance. She is not ill-appearing or toxic-appearing. HENT:      Head: Normocephalic and atraumatic. Right Ear: Tympanic membrane, ear canal and external ear normal. There is no impacted cerumen. Left Ear: Tympanic membrane, ear canal and external ear normal. There is no impacted cerumen. Nose: Congestion present. No rhinorrhea.       Mouth/Throat:      Mouth: Mucous membranes are moist. Pharynx: Oropharynx is clear. No oropharyngeal exudate or posterior oropharyngeal erythema. Eyes:      General:         Left eye: Discharge present. Extraocular Movements: Extraocular movements intact. Pupils: Pupils are equal, round, and reactive to light. Comments: L eye conjunctivitis with no present purulent discharge, with mild crusting and clear discharge after sneezing. There is no sae-orbital or orbital edema or eyelid swelling noted. Cardiovascular:      Rate and Rhythm: Normal rate and regular rhythm. Pulses: Normal pulses. Heart sounds: Normal heart sounds. No murmur heard. No friction rub. No gallop. Pulmonary:      Effort: Pulmonary effort is normal. No respiratory distress. Breath sounds: Normal breath sounds. No stridor. No wheezing, rhonchi or rales. Musculoskeletal:         General: No deformity or signs of injury. Cervical back: Neck supple. Right lower leg: No edema. Left lower leg: No edema. Skin:     General: Skin is warm and dry. Capillary Refill: Capillary refill takes less than 2 seconds. Neurological:      General: No focal deficit present. Mental Status: She is alert. Mental status is at baseline.    Psychiatric:         Mood and Affect: Mood normal.         Behavior: Behavior normal.            Lab Review:   Hospital Outpatient Visit on 11/14/2022   Component Date Value    Hemoglobin A1C 11/14/2022 4.9     eAG 11/14/2022 93.9     Hep C Ab Interp 11/14/2022 Non-reactive     TSH 11/14/2022 2.78     Cholesterol, Total 11/14/2022 168     Triglycerides 11/14/2022 72     HDL 11/14/2022 36 (A)     LDL Calculated 11/14/2022 118 (A)     VLDL Cholesterol Calcula* 11/14/2022 14     Misc Test Order 11/14/2022 SEE NOTE    Admission on 11/10/2022, Discharged on 11/10/2022   Component Date Value    Ventricular Rate 11/10/2022 106     Atrial Rate 11/10/2022 106     P-R Interval 11/10/2022 160     QRS Duration 11/10/2022 92     Q-T Interval 11/10/2022 336     QTc Calculation (Bazett) 11/10/2022 446     P Axis 11/10/2022 59     R Axis 11/10/2022 53     T Axis 11/10/2022 42     Diagnosis 11/10/2022 Sinus tachycardiaPossible Left atrial enlargementBorderline ECGNo previous ECGs availableConfirmed by Gaurav Mills MD, Mich Abreu ([id number]) on 11/12/2022 2:26:06 PM     WBC 11/10/2022 12.2 (A)     RBC 11/10/2022 4.69     Hemoglobin 11/10/2022 14.1     Hematocrit 11/10/2022 41.8     MCV 11/10/2022 89.0     MCH 11/10/2022 30.0     MCHC 11/10/2022 33.7     RDW 11/10/2022 12.3 (A)     Platelets 82/74/7502 289     MPV 11/10/2022 8.2     Neutrophils % 11/10/2022 70.9     Lymphocytes % 11/10/2022 19.7     Monocytes % 11/10/2022 7.0     Eosinophils % 11/10/2022 1.8     Basophils % 11/10/2022 0.6     Neutrophils Absolute 11/10/2022 8.7 (A)     Lymphocytes Absolute 11/10/2022 2.4     Monocytes Absolute 11/10/2022 0.9     Eosinophils Absolute 11/10/2022 0.2     Basophils Absolute 11/10/2022 0.1     Sodium 11/10/2022 135 (A)     Potassium reflex Magnesi* 11/10/2022 3.4 (A)     Chloride 11/10/2022 100     CO2 11/10/2022 25     Anion Gap 11/10/2022 10     Glucose 11/10/2022 107 (A)     BUN 11/10/2022 7     Creatinine 11/10/2022 0.6     Est, Glom Filt Rate 11/10/2022 >60     Calcium 11/10/2022 9.3     Total Protein 11/10/2022 8.0     Albumin 11/10/2022 4.7     Albumin/Globulin Ratio 11/10/2022 1.4     Total Bilirubin 11/10/2022 0.3     Alkaline Phosphatase 11/10/2022 88     ALT 11/10/2022 17     AST 11/10/2022 17     Troponin 11/10/2022 <0.01     hCG Qual 11/10/2022 Negative     D-Dimer, Quant 11/10/2022 0.40     Magnesium 11/10/2022 2.00           Assessment/Plan:  Lana was seen today for congestion, pharyngitis, headache and conjunctivitis.     Diagnoses and all orders for this visit:    Upper respiratory tract infection, unspecified type    Sore throat  -     COVID-19  -     Cancel: Rapid influenza A/B antigens  -     POCT Influenza A/B    Congestion of nasal sinus  - COVID-19  -     Cancel: Rapid influenza A/B antigens  -     POCT Influenza A/B    Sick headache  -     COVID-19  -     Cancel: Rapid influenza A/B antigens  -     POCT Influenza A/B    Tachycardia    Other orders  -     Cancel: COVID-19  -     Cancel: COVID-19  -     Cancel: COVID-19  -     moxifloxacin (VIGAMOX) 0.5 % ophthalmic solution; Place 1 drop into the left eye 3 times daily for 7 days      Patient is a 25year old Female with past medical history of Asthma, back pain, sinus tachycardia, elevated BP without HTN, headaches. Patient presents in office today to follow-up on persistent congestion for >2 weeks duration, sore throat, and fatigue. Upper Respiratory Infection vs. Acute Rhinosinusitis    Not controlled, new complaint today. Patient without exudates/erythema of the throat, without uvular enlargement, without sinus pain/pressure. Patient with previous positive rapid strep test on 11/10/2022 with initial improvement in symptoms after antibiotics. DDx at this time likely viral (self resolving) versus bacterial URI vs. Acute Rhinosinusitis in nature. No signs of bacterial infection as noted above. These were reviewed with patient in-office. OTC meds (listed below) for symptomatic relief. Patient can continue OTC Mucinex for congestion and Tylenol for fever/pain relief (without concurrent use with naproxen). I advised patient on adequate fluid hydration and bedrest.  Patient can continue saline nasal spray at this time. Consider starting Flonase by next week if symptoms have not improved. Ordered COVID testing and rapid flu today. Rapid flu was negative for influenza A/B. Patient previously had adenoidectomy with unknown status of tonsils per pt hx. [place] tonsils likely removed based on physical exam today. Will prescribe Vigamox (moxifloxacin) 0.5% ophthalmic solution TID for 7 days for left eye erythema/conjunctivitis given ongoing symptoms.  Given clear discharge present today likely viral vs. Bacterial in nature though. RTC as needed or by next Monday if symptoms worsen or do not improve. Sinus Tachycardia    Not controlled, not at goal.  Patient continues to have persistent palpitations daily. Heart rate at 90 in office today. Borderline tachycardic on exam.  Patient currently being managed and followed by cardiology. Encourage patient to obtain echo and exercise stress test after Thanksgiving. Encourage patient to wear 2-week cardiac monitor. Encourage patient to follow-up with cardiology in 3 months. Health Maintenance Due:  Health Maintenance Due   Topic Date Due    HIV screen  Never done    HPV vaccine (2 - 3-dose series) 08/17/2018    Pap smear  Never done    COVID-19 Vaccine (3 - Booster for Pfizer series) 05/17/2022    Flu vaccine (1) 08/01/2022    DTaP/Tdap/Td vaccine (7 - Td or Tdap) 08/15/2022    Chlamydia/GC screen  12/02/2022          Health care decision maker:  <72years old      Health Maintenance: (USPSTF Recommendations)  (F) Breast Cancer Screen: (40-49 (C), 50-74 biennial screening mammogram (B))  (F) Cervical Cancer Screen: (21-29 q3yr cytology alone; 30-65 q3yr cytology alone, q5yr with hrHPV alone, or q5yr cytology+hrHPV (A))  CRC/Colonoscopy Screening: (adults 45-49 (B), 50-75 (A))  Lung Ca Screening: Annual LDCT (+smoker age 49-80, smoked within 15 years, total of 20 pack yr history (B)):  DEXA Screen: (women >65 and older, <65 if at risk/postmenopausal (B))  HIV Screen: (16-65 yr old, and all pregnant patients (A)): Hep C Screen: (18-79 yr old (B)):  HCC Screen: (all pts with cirrhosis and high risk Hep B (US q6 mo)):  Immunizations:    RTC:  Return in about 6 weeks (around 1/4/2023) for as scheduled with Dr. Opal Sheikh.     EMR Dragon/transcription disclaimer:  Much of this encounter note is electronic transcription/translation of spoken language to printed texts.   The electronic translation of spoken language may be erroneous, or at times, nonsensical words or phrases may be inadvertently transcribed.   Although I have reviewed the note for such errors, some may still exist.

## 2022-11-22 NOTE — TELEPHONE ENCOUNTER
Location of patient: [address] call from [place] at [facility] with [employer]. Subjective: Caller states \"Sinus congestion x 2 weeks (unable to breath out nose); sore throat (tx for strep on 11/10), I feel really tired and sick for like a month. \"     Current Symptoms: sinus congestion (unable to breath out of nose), PND, sore throat, bilateral ear \"popping\" when swallowing, mild headache; intermittent productive cough - green phlegm. Onset: 2 weeks ago; worsening    Associated Symptoms: NA    Pain Severity: 4/10; aching; intermittent - sore throat when swallowing    Temperature: na  denies fever    What has been tried: Afrin x 3 days got better, currently using every night; cephalexin, mucinex, multi-vitamins    LMP:  11/17/2022  Pregnant: No    Recommended disposition: See in Office Today or Tomorrow    Care advice provided, patient verbalizes understanding; denies any other questions or concerns; instructed to call back for any new or worsening symptoms. Patient/Caller agrees with recommended disposition; writer provided warm transfer to Mansoor Escobar at [facility] for appointment scheduling    Attention Provider: Thank you for allowing me to participate in the care of your patient. The patient was connected to triage in response to information provided to the ECC/PSC. Please do not respond through this encounter as the response is not directed to a shared pool.     Reason for Disposition   Sinus congestion (pressure, fullness) present > 10 days    Protocols used: Sinus Pain or Congestion-ADULT-[place]

## 2022-11-23 ENCOUNTER — OFFICE VISIT (OUTPATIENT)
Dept: PRIMARY CARE CLINIC | Age: 22
End: 2022-11-23
Payer: MEDICAID

## 2022-11-23 VITALS
OXYGEN SATURATION: 99 % | HEART RATE: 90 BPM | WEIGHT: 148.4 LBS | BODY MASS INDEX: 29.92 KG/M2 | HEIGHT: 59 IN | TEMPERATURE: 98.9 F | DIASTOLIC BLOOD PRESSURE: 82 MMHG | SYSTOLIC BLOOD PRESSURE: 130 MMHG

## 2022-11-23 DIAGNOSIS — R09.81 CONGESTION OF NASAL SINUS: ICD-10-CM

## 2022-11-23 DIAGNOSIS — G43.909 SICK HEADACHE: ICD-10-CM

## 2022-11-23 DIAGNOSIS — J06.9 UPPER RESPIRATORY TRACT INFECTION, UNSPECIFIED TYPE: Primary | ICD-10-CM

## 2022-11-23 DIAGNOSIS — R00.0 TACHYCARDIA: ICD-10-CM

## 2022-11-23 DIAGNOSIS — J02.9 SORE THROAT: ICD-10-CM

## 2022-11-23 PROBLEM — H43.393 FLOATERS IN VISUAL FIELD, BILATERAL: Status: ACTIVE | Noted: 2022-06-02

## 2022-11-23 PROBLEM — R22.32 MASS OF WRIST, LEFT: Status: ACTIVE | Noted: 2022-06-02

## 2022-11-23 PROBLEM — L70.0 ACNE VULGARIS: Status: ACTIVE | Noted: 2017-09-28

## 2022-11-23 LAB
INFLUENZA A ANTIBODY: NORMAL
INFLUENZA B ANTIBODY: NORMAL

## 2022-11-23 PROCEDURE — G8427 DOCREV CUR MEDS BY ELIG CLIN: HCPCS | Performed by: STUDENT IN AN ORGANIZED HEALTH CARE EDUCATION/TRAINING PROGRAM

## 2022-11-23 PROCEDURE — G8417 CALC BMI ABV UP PARAM F/U: HCPCS | Performed by: STUDENT IN AN ORGANIZED HEALTH CARE EDUCATION/TRAINING PROGRAM

## 2022-11-23 PROCEDURE — 87804 INFLUENZA ASSAY W/OPTIC: CPT | Performed by: STUDENT IN AN ORGANIZED HEALTH CARE EDUCATION/TRAINING PROGRAM

## 2022-11-23 PROCEDURE — G8484 FLU IMMUNIZE NO ADMIN: HCPCS | Performed by: STUDENT IN AN ORGANIZED HEALTH CARE EDUCATION/TRAINING PROGRAM

## 2022-11-23 PROCEDURE — 99213 OFFICE O/P EST LOW 20 MIN: CPT | Performed by: STUDENT IN AN ORGANIZED HEALTH CARE EDUCATION/TRAINING PROGRAM

## 2022-11-23 PROCEDURE — 1036F TOBACCO NON-USER: CPT | Performed by: STUDENT IN AN ORGANIZED HEALTH CARE EDUCATION/TRAINING PROGRAM

## 2022-11-23 RX ORDER — PREDNISONE 10 MG/1
TABLET ORAL
COMMUNITY
Start: 2022-11-10 | End: 2022-11-23

## 2022-11-23 RX ORDER — MOXIFLOXACIN 5 MG/ML
1 SOLUTION/ DROPS OPHTHALMIC 3 TIMES DAILY
Qty: 1 EACH | Refills: 0 | Status: SHIPPED | OUTPATIENT
Start: 2022-11-23 | End: 2022-11-30

## 2022-11-23 ASSESSMENT — PATIENT HEALTH QUESTIONNAIRE - PHQ9
SUM OF ALL RESPONSES TO PHQ9 QUESTIONS 1 & 2: 0
2. FEELING DOWN, DEPRESSED OR HOPELESS: 0
7. TROUBLE CONCENTRATING ON THINGS, SUCH AS READING THE NEWSPAPER OR WATCHING TELEVISION: 0
10. IF YOU CHECKED OFF ANY PROBLEMS, HOW DIFFICULT HAVE THESE PROBLEMS MADE IT FOR YOU TO DO YOUR WORK, TAKE CARE OF THINGS AT HOME, OR GET ALONG WITH OTHER PEOPLE: NOT DIFFICULT AT ALL
3. TROUBLE FALLING OR STAYING ASLEEP: 1
SUM OF ALL RESPONSES TO PHQ QUESTIONS 1-9: 3
5. POOR APPETITE OR OVEREATING: 1
SUM OF ALL RESPONSES TO PHQ QUESTIONS 1-9: 3
9. THOUGHTS THAT YOU WOULD BE BETTER OFF DEAD, OR OF HURTING YOURSELF: 0
6. FEELING BAD ABOUT YOURSELF - OR THAT YOU ARE A FAILURE OR HAVE LET YOURSELF OR YOUR FAMILY DOWN: 0
1. LITTLE INTEREST OR PLEASURE IN DOING THINGS: 0
8. MOVING OR SPEAKING SO SLOWLY THAT OTHER PEOPLE COULD HAVE NOTICED. OR THE OPPOSITE, BEING SO FIGETY OR RESTLESS THAT YOU HAVE BEEN MOVING AROUND A LOT MORE THAN USUAL: 0
4. FEELING TIRED OR HAVING LITTLE ENERGY: 1
SUM OF ALL RESPONSES TO PHQ QUESTIONS 1-9: 3
SUM OF ALL RESPONSES TO PHQ QUESTIONS 1-9: 3

## 2022-11-23 ASSESSMENT — ANXIETY QUESTIONNAIRES
3. WORRYING TOO MUCH ABOUT DIFFERENT THINGS: 0
7. FEELING AFRAID AS IF SOMETHING AWFUL MIGHT HAPPEN: 0
2. NOT BEING ABLE TO STOP OR CONTROL WORRYING: 0
4. TROUBLE RELAXING: 0
1. FEELING NERVOUS, ANXIOUS, OR ON EDGE: 1
6. BECOMING EASILY ANNOYED OR IRRITABLE: 0
IF YOU CHECKED OFF ANY PROBLEMS ON THIS QUESTIONNAIRE, HOW DIFFICULT HAVE THESE PROBLEMS MADE IT FOR YOU TO DO YOUR WORK, TAKE CARE OF THINGS AT HOME, OR GET ALONG WITH OTHER PEOPLE: NOT DIFFICULT AT ALL
5. BEING SO RESTLESS THAT IT IS HARD TO SIT STILL: 0
GAD7 TOTAL SCORE: 1

## 2022-11-23 ASSESSMENT — PATIENT HEALTH QUESTIONNAIRE - GENERAL
HAS THERE BEEN A TIME IN THE PAST MONTH WHEN YOU HAVE HAD SERIOUS THOUGHTS ABOUT ENDING YOUR LIFE?: NO
HAVE YOU EVER, IN YOUR WHOLE LIFE, TRIED TO KILL YOURSELF OR MADE A SUICIDE ATTEMPT?: NO
IN THE PAST YEAR HAVE YOU FELT DEPRESSED OR SAD MOST DAYS, EVEN IF YOU FELT OKAY SOMETIMES?: NO

## 2022-11-23 ASSESSMENT — ENCOUNTER SYMPTOMS
SINUS PRESSURE: 0
EYE ITCHING: 1
EYE REDNESS: 1
SINUS PAIN: 0
SHORTNESS OF BREATH: 1

## 2022-11-23 NOTE — TELEPHONE ENCOUNTER
Will defer these concerns to Dr. Joe Miller who will be seeing the patient for an acute visit tomorrow at 10:30 AM.

## 2022-11-25 LAB — SARS-COV-2: NOT DETECTED

## 2022-12-07 ENCOUNTER — OFFICE VISIT (OUTPATIENT)
Dept: PRIMARY CARE CLINIC | Age: 22
End: 2022-12-07
Payer: MEDICAID

## 2022-12-07 VITALS
TEMPERATURE: 98.5 F | HEIGHT: 59 IN | BODY MASS INDEX: 29.64 KG/M2 | WEIGHT: 147 LBS | HEART RATE: 86 BPM | DIASTOLIC BLOOD PRESSURE: 80 MMHG | OXYGEN SATURATION: 98 % | SYSTOLIC BLOOD PRESSURE: 126 MMHG

## 2022-12-07 DIAGNOSIS — Z11.3 ROUTINE SCREENING FOR STI (SEXUALLY TRANSMITTED INFECTION): ICD-10-CM

## 2022-12-07 DIAGNOSIS — J31.0 RHINITIS, UNSPECIFIED TYPE: Primary | ICD-10-CM

## 2022-12-07 DIAGNOSIS — Z11.4 SCREENING FOR HIV WITHOUT PRESENCE OF RISK FACTORS: ICD-10-CM

## 2022-12-07 PROCEDURE — G8417 CALC BMI ABV UP PARAM F/U: HCPCS

## 2022-12-07 PROCEDURE — G8484 FLU IMMUNIZE NO ADMIN: HCPCS

## 2022-12-07 PROCEDURE — G8427 DOCREV CUR MEDS BY ELIG CLIN: HCPCS

## 2022-12-07 PROCEDURE — 99214 OFFICE O/P EST MOD 30 MIN: CPT

## 2022-12-07 PROCEDURE — 1036F TOBACCO NON-USER: CPT

## 2022-12-07 RX ORDER — MONTELUKAST SODIUM 10 MG/1
10 TABLET ORAL NIGHTLY
Qty: 90 TABLET | Refills: 0 | Status: SHIPPED | OUTPATIENT
Start: 2022-12-07

## 2022-12-07 RX ORDER — LEVOCETIRIZINE DIHYDROCHLORIDE 5 MG/1
5 TABLET, FILM COATED ORAL NIGHTLY
Qty: 30 TABLET | Refills: 3 | Status: SHIPPED | OUTPATIENT
Start: 2022-12-07

## 2022-12-07 RX ORDER — FLUTICASONE PROPIONATE 50 MCG
2 SPRAY, SUSPENSION (ML) NASAL DAILY
Qty: 48 G | Refills: 1 | Status: SHIPPED | OUTPATIENT
Start: 2022-12-07

## 2022-12-07 ASSESSMENT — ANXIETY QUESTIONNAIRES
4. TROUBLE RELAXING: 0
5. BEING SO RESTLESS THAT IT IS HARD TO SIT STILL: 0
IF YOU CHECKED OFF ANY PROBLEMS ON THIS QUESTIONNAIRE, HOW DIFFICULT HAVE THESE PROBLEMS MADE IT FOR YOU TO DO YOUR WORK, TAKE CARE OF THINGS AT HOME, OR GET ALONG WITH OTHER PEOPLE: NOT DIFFICULT AT ALL
3. WORRYING TOO MUCH ABOUT DIFFERENT THINGS: 1
2. NOT BEING ABLE TO STOP OR CONTROL WORRYING: 0
6. BECOMING EASILY ANNOYED OR IRRITABLE: 1
7. FEELING AFRAID AS IF SOMETHING AWFUL MIGHT HAPPEN: 0
GAD7 TOTAL SCORE: 3
1. FEELING NERVOUS, ANXIOUS, OR ON EDGE: 1

## 2022-12-07 ASSESSMENT — PATIENT HEALTH QUESTIONNAIRE - PHQ9
6. FEELING BAD ABOUT YOURSELF - OR THAT YOU ARE A FAILURE OR HAVE LET YOURSELF OR YOUR FAMILY DOWN: 0
1. LITTLE INTEREST OR PLEASURE IN DOING THINGS: 0
SUM OF ALL RESPONSES TO PHQ9 QUESTIONS 1 & 2: 0
SUM OF ALL RESPONSES TO PHQ QUESTIONS 1-9: 2
9. THOUGHTS THAT YOU WOULD BE BETTER OFF DEAD, OR OF HURTING YOURSELF: 0
7. TROUBLE CONCENTRATING ON THINGS, SUCH AS READING THE NEWSPAPER OR WATCHING TELEVISION: 0
SUM OF ALL RESPONSES TO PHQ QUESTIONS 1-9: 2
2. FEELING DOWN, DEPRESSED OR HOPELESS: 0
10. IF YOU CHECKED OFF ANY PROBLEMS, HOW DIFFICULT HAVE THESE PROBLEMS MADE IT FOR YOU TO DO YOUR WORK, TAKE CARE OF THINGS AT HOME, OR GET ALONG WITH OTHER PEOPLE: NOT DIFFICULT AT ALL
SUM OF ALL RESPONSES TO PHQ QUESTIONS 1-9: 2
5. POOR APPETITE OR OVEREATING: 0
3. TROUBLE FALLING OR STAYING ASLEEP: 0
SUM OF ALL RESPONSES TO PHQ QUESTIONS 1-9: 2
4. FEELING TIRED OR HAVING LITTLE ENERGY: 2
8. MOVING OR SPEAKING SO SLOWLY THAT OTHER PEOPLE COULD HAVE NOTICED. OR THE OPPOSITE, BEING SO FIGETY OR RESTLESS THAT YOU HAVE BEEN MOVING AROUND A LOT MORE THAN USUAL: 0

## 2022-12-07 ASSESSMENT — PATIENT HEALTH QUESTIONNAIRE - GENERAL
HAVE YOU EVER, IN YOUR WHOLE LIFE, TRIED TO KILL YOURSELF OR MADE A SUICIDE ATTEMPT?: NO
HAS THERE BEEN A TIME IN THE PAST MONTH WHEN YOU HAVE HAD SERIOUS THOUGHTS ABOUT ENDING YOUR LIFE?: NO
IN THE PAST YEAR HAVE YOU FELT DEPRESSED OR SAD MOST DAYS, EVEN IF YOU FELT OKAY SOMETIMES?: NO

## 2022-12-07 NOTE — PROGRESS NOTES
[address] and [facility]                                             [address] [facility], [facility], [address] [place] [address]        Phone: [phone]      Name:  Tania Benjamin  :    2000    Consultants:   Patient Care Team:  Ariane Lawton MD as PCP - General (Family Medicine)    Chief Complaint:     Tania Benjamin is a 25 y.o. female  who presents today for an established patient care visit with [address] [place] [address] as noted below. HPI:     Patient is here with a chief complaint of a recurring sore throat for the past month and a half. It began with a sore throat and fever about a month and a half ago, when she went to a clinic and tested positive via rapid strep. She was given azithromycin for treatment since she has a history fo penicillin allergy. She said she got better with the abx, then when she finished them, the sore throat came back. She then went to a clinic again and tested positive via rapid strep again. This time she was given cephalexin and finished that course of antibiotics, but said she still had a sore throat and also sinus congestion as well. She came to this office on  and took rapid covid and flu which were negative and was told to take OTC Mucinex for congestion and Tylenol for fever/pain relief, with the presumption at the time of a likely viral sinusitis etiology. She also takes OTC Claritin for allregies. However, she said the symptoms have remained. The main symptoms that have been troubling her are a sore throat that she feels on the left side of throat when she swallows, sinus congestion, postnasal mucus dripping down her throat that she says is giving her a productive cough, which is especially worse when she wakes in the morning. She says her nose is stuffy, but gets better when she is more active as opposed to resting.  She also feels popping in her ears when she swallows. No past medical hx of recurrent ear infections or tubes placed in ears in her childhood. No fevers or other constitutional symptoms. Rest of review of symptoms is negative. Also addressed patient's care gaps, and she agreed to standing orders for [facility] and HIV screening tests for her to be able to get at some point in the near future after this visit and her acute issue is addressed. Patient Active Problem List   Diagnosis    Family history of early CAD    Chronic left-sided thoracic back pain    Palpitations    Class 1 obesity without serious comorbidity in adult    Visual disturbances    Tachycardia    Acne vulgaris    Floaters in visual field, bilateral    Mass of wrist, left         Past Medical History:    Past Medical History:   Diagnosis Date    Asthma        Past Surgical History:  Past Surgical History:   Procedure Laterality Date    ADENOIDECTOMY  2004       Home Meds:  Prior to Visit Medications    Medication Sig Taking?  Authorizing Provider   fluticasone (FLONASE) 50 MCG/ACT nasal spray 2 sprays by Each Nostril route daily Yes Maximino Delaneyhed, DO   montelukast (SINGULAIR) 10 MG tablet Take 1 tablet by mouth nightly Yes Maximino Rebollar, DO   levocetirizine (XYZAL) 5 MG tablet Take 1 tablet by mouth nightly Yes Maximino Sutton-Bassamhed, DO   naproxen (NAPROSYN) 500 MG tablet Take 1 tablet by mouth 2 times daily for 20 doses  Patient taking differently: Take 500 mg by mouth 2 times daily as needed  Sarah Yoder PA-C       Allergies:    Motrin [ibuprofen micronized], Clavulanic acid, Ibuprofen, and Penicillins    Family History:       Problem Relation Age of Onset    Alcohol Abuse Mother     Diabetes Maternal Aunt     Hypothyroidism Maternal Aunt     High Blood Pressure Maternal Aunt     Heart Attack Maternal Aunt         Mid-50s    Stroke Maternal Uncle     High Cholesterol Maternal Grandfather     Heart Disease Maternal Grandfather          Health Maintenance Completed:  Health Maintenance   Topic Date Due    HIV screen  Never done    HPV vaccine (2 - 3-dose series) 08/17/2018    Pap smear  Never done    COVID-19 Vaccine (3 - Booster for Pfizer series) 05/17/2022    Flu vaccine (1) 08/01/2022    DTaP/Tdap/Td vaccine (7 - Td or Tdap) 08/15/2022    Chlamydia/GC screen  12/02/2022    Depression Screen  12/07/2023    Hepatitis A vaccine  Completed    Hib vaccine  Completed    Varicella vaccine  Completed    Meningococcal (ACWY) vaccine  Completed    Hepatitis C screen  Completed    Pneumococcal 0-64 years Vaccine  Aged [employer] History   Administered Date(s) Administered    COVID-19, PFIZER GRAY top, DO NOT Dilute, (age 15 y+), IM, 30 mcg/0.3 mL 02/22/2022, 03/22/2022    DTaP (Infanrix) 2001, 2001, 2001    DTaP vaccine 07/24/2002, 09/21/2005    HIB PRP-T (ActHIB, Hiberix) 2001, 2001, 2001    HPV Quadrivalent (Gardasil) 07/20/2018    Hepatitis A Ped/Adol (Havrix, Vaqta) 08/15/2012, 09/22/2014    Hepatitis B 2001, 2001, 2001    Hib (HbOC) 07/24/2002    Influenza Virus Vaccine 10/09/2004, 10/09/2004, 10/30/2008, 10/30/2008, 10/14/2011, 10/14/2011    MMR 11/15/2002, 09/21/2005    Meningococcal B, OMV (Bexsero) 07/20/2018    Meningococcal MCV4P (Menactra) 08/15/2012, 07/20/2018    Pneumococcal Conjugate 7-valent ([employer]) 2001, 2001, 2001, 03/19/2002    Polio IPV (IPOL) 2001, 2001, 07/24/2002, 09/21/2005    Tdap (Boostrix, Adacel) 08/15/2012    Varicella (Varivax) 07/24/2002, 08/11/2006         Review of Systems:  Review of Systems   All other systems reviewed and are negative. Physical Exam:   Vitals:    12/07/22 0945   BP: 126/80   Site: Left Upper Arm   Position: Sitting   Cuff Size: Small Adult   Pulse: 86   Temp: 98.5 °F (36.9 °C)   TempSrc: Oral   SpO2: 98%   Weight: 147 lb (66.7 kg)   Height: 4' 11\" (1.499 m)     Body mass index is 29.69 kg/m².     Wt Readings from Last 3 Encounters:   12/07/22 147 lb (66.7 kg)   11/23/22 148 lb 6.4 oz (67.3 kg)   11/15/22 147 lb (66.7 kg)       BP Readings from Last 3 Encounters:   12/07/22 126/80   11/23/22 130/82   11/15/22 120/72       Physical Exam  Constitutional:       Appearance: Normal appearance. She is normal weight. HENT:      Head: Normocephalic and atraumatic. Right Ear: Tympanic membrane, ear canal and external ear normal. There is no impacted cerumen. Left Ear: Tympanic membrane, ear canal and external ear normal. There is no impacted cerumen. Nose: Congestion present. No rhinorrhea. Mouth/Throat:      Mouth: Mucous membranes are moist.      Pharynx: Oropharynx is clear. No oropharyngeal exudate or posterior oropharyngeal erythema. Cardiovascular:      Rate and Rhythm: Normal rate and regular rhythm. Pulses: Normal pulses. Heart sounds: Normal heart sounds. Pulmonary:      Effort: Pulmonary effort is normal. No respiratory distress. Breath sounds: Normal breath sounds. Lymphadenopathy:      Cervical: No cervical adenopathy. Neurological:      General: No focal deficit present. Mental Status: She is alert and oriented to person, place, and time. Mental status is at baseline. Psychiatric:         Mood and Affect: Mood normal.         Behavior: Behavior normal.         Thought Content:  Thought content normal.         Judgment: Judgment normal.            Lab Review:   Office Visit on 11/23/2022   Component Date Value    SARS-CoV-2 11/23/2022 Not Detected     Influenza A Ab 11/23/2022 Neg     Influenza B Ab 11/23/2022 [facility] AND [facility] Outpatient Visit on 11/14/2022   Component Date Value    Hemoglobin A1C 11/14/2022 4.9     eAG 11/14/2022 93.9     Hep C Ab Interp 11/14/2022 Non-reactive     TSH 11/14/2022 2.78     Cholesterol, Total 11/14/2022 168     Triglycerides 11/14/2022 72     HDL 11/14/2022 36 (A)     LDL Calculated 11/14/2022 118 (A)     VLDL Cholesterol Calcula* 11/14/2022 14 Misc Test Order 11/14/2022 SEE NOTE    Admission on 11/10/2022, Discharged on 11/10/2022   Component Date Value    Ventricular Rate 11/10/2022 106     Atrial Rate 11/10/2022 106     P-R Interval 11/10/2022 160     QRS Duration 11/10/2022 92     Q-T Interval 11/10/2022 336     QTc Calculation (Bazett) 11/10/2022 446     P Axis 11/10/2022 59     R Axis 11/10/2022 53     T Axis 11/10/2022 42     Diagnosis 11/10/2022 Sinus tachycardiaPossible Left atrial enlargementBorderline ECGNo previous ECGs availableConfirmed by Ramya Phan MD, Jeremy Hope ([id number]) on 11/12/2022 2:26:06 PM     WBC 11/10/2022 12.2 (A)     RBC 11/10/2022 4.69     Hemoglobin 11/10/2022 14.1     Hematocrit 11/10/2022 41.8     MCV 11/10/2022 89.0     MCH 11/10/2022 30.0     MCHC 11/10/2022 33.7     RDW 11/10/2022 12.3 (A)     Platelets 58/86/5901 289     MPV 11/10/2022 8.2     Neutrophils % 11/10/2022 70.9     Lymphocytes % 11/10/2022 19.7     Monocytes % 11/10/2022 7.0     Eosinophils % 11/10/2022 1.8     Basophils % 11/10/2022 0.6     Neutrophils Absolute 11/10/2022 8.7 (A)     Lymphocytes Absolute 11/10/2022 2.4     Monocytes Absolute 11/10/2022 0.9     Eosinophils Absolute 11/10/2022 0.2     Basophils Absolute 11/10/2022 0.1     Sodium 11/10/2022 135 (A)     Potassium reflex Magnesi* 11/10/2022 3.4 (A)     Chloride 11/10/2022 100     CO2 11/10/2022 25     Anion Gap 11/10/2022 10     Glucose 11/10/2022 107 (A)     BUN 11/10/2022 7     Creatinine 11/10/2022 0.6     Est, Glom Filt Rate 11/10/2022 >60     Calcium 11/10/2022 9.3     Total Protein 11/10/2022 8.0     Albumin 11/10/2022 4.7     Albumin/Globulin Ratio 11/10/2022 1.4     Total Bilirubin 11/10/2022 0.3     Alkaline Phosphatase 11/10/2022 88     ALT 11/10/2022 17     AST 11/10/2022 17     Troponin 11/10/2022 <0.01     hCG Qual 11/10/2022 Negative     D-Dimer, Quant 11/10/2022 0.40     Magnesium 11/10/2022 2.00           Assessment/Plan:  Lana was seen today for pharyngitis.     Diagnoses and all orders for this visit:    Rhinitis, unspecified type  - Centor criteria score of zero - patient very unlikely to have streptococcal pharyngitis  - Symptoms are possible post-inflammatory recovery mixed with allergies. Patient is unsure of what she could be allergic to, and says there have been no changes in her daily routines or where she lives or works that would indicate a potential etiology. - Regardless, without any fevers or constitutional symptoms, and physical examination of ears, nose, throat all benign, the next best step is to treat a presumed allergic rhinosinusitis more aggressively. Plan - discontinue Claritin and begin the following:  -     fluticasone (FLONASE) 50 MCG/ACT nasal spray; 2 sprays by Each Nostril route daily  -     montelukast (SINGULAIR) 10 MG tablet; Take 1 tablet by mouth nightly  -     levocetirizine (XYZAL) 5 MG tablet; Take 1 tablet by mouth nightly     Screening for HIV without presence of risk factors  -     HIV Screen; Future    Routine screening for STI (sexually transmitted infection)  -     C.trachomatis N.gonorrhoeae DNA, Urine [[id number]]; Future        Health Maintenance Due:  Health Maintenance Due   Topic Date Due    HIV screen  Never done    HPV vaccine (2 - 3-dose series) 08/17/2018    Pap smear  Never done    COVID-19 Vaccine (3 - Booster for Pfizer series) 05/17/2022    Flu vaccine (1) 08/01/2022    DTaP/Tdap/Td vaccine (7 - Td or Tdap) 08/15/2022    Chlamydia/GC screen  12/02/2022        Health care decision maker:  <72years old    Health Maintenance: (USPSTF Recommendations)   HIV Screen: (16-65 yr old, and all pregnant patients (A)): ordered     RTC:  Return in about 2 weeks (around 12/21/2022). EMR Dragon/transcription disclaimer:  Much of this encounter note is electronic transcription/translation of spoken language to printed texts.   The electronic translation of spoken language may be erroneous, or at times, nonsensical words or phrases may be inadvertently transcribed.   Although I have reviewed the note for such errors, some may still exist.

## 2023-01-25 ENCOUNTER — TELEPHONE (OUTPATIENT)
Dept: CARDIOLOGY CLINIC | Age: 23
End: 2023-01-25

## 2023-01-25 ENCOUNTER — HOSPITAL ENCOUNTER (OUTPATIENT)
Dept: CARDIOLOGY | Age: 23
Discharge: HOME | End: 2023-01-25
Payer: MEDICAID

## 2023-01-25 DIAGNOSIS — R00.0 TACHYCARDIA: ICD-10-CM

## 2023-01-25 LAB
LV EF: 55 %
LVEF MODALITY: NORMAL

## 2023-01-25 PROCEDURE — 93306 TTE W/DOPPLER COMPLETE: CPT

## 2023-01-25 NOTE — TELEPHONE ENCOUNTER
----- Message from Surya Edward MD sent at 1/25/2023  1:46 PM EST -----  Let patient know echo test shows normal heart function, no new orders or changes at this time. Thanks.

## 2023-02-16 SDOH — ECONOMIC STABILITY: HOUSING INSECURITY
IN THE LAST 12 MONTHS, WAS THERE A TIME WHEN YOU DID NOT HAVE A STEADY PLACE TO SLEEP OR SLEPT IN A SHELTER (INCLUDING NOW)?: NO

## 2023-02-16 SDOH — ECONOMIC STABILITY: TRANSPORTATION INSECURITY
IN THE PAST 12 MONTHS, HAS LACK OF TRANSPORTATION KEPT YOU FROM MEETINGS, WORK, OR FROM GETTING THINGS NEEDED FOR DAILY LIVING?: NO

## 2023-02-16 SDOH — ECONOMIC STABILITY: INCOME INSECURITY: HOW HARD IS IT FOR YOU TO PAY FOR THE VERY BASICS LIKE FOOD, HOUSING, MEDICAL CARE, AND HEATING?: NOT VERY HARD

## 2023-02-16 SDOH — ECONOMIC STABILITY: FOOD INSECURITY: WITHIN THE PAST 12 MONTHS, YOU WORRIED THAT YOUR FOOD WOULD RUN OUT BEFORE YOU GOT MONEY TO BUY MORE.: NEVER TRUE

## 2023-02-16 SDOH — ECONOMIC STABILITY: FOOD INSECURITY: WITHIN THE PAST 12 MONTHS, THE FOOD YOU BOUGHT JUST DIDN'T LAST AND YOU DIDN'T HAVE MONEY TO GET MORE.: NEVER TRUE

## 2023-02-17 ENCOUNTER — OFFICE VISIT (OUTPATIENT)
Dept: PRIMARY CARE CLINIC | Age: 23
End: 2023-02-17

## 2023-02-17 VITALS
OXYGEN SATURATION: 98 % | HEIGHT: 59 IN | HEART RATE: 75 BPM | TEMPERATURE: 97.6 F | WEIGHT: 149.4 LBS | DIASTOLIC BLOOD PRESSURE: 72 MMHG | RESPIRATION RATE: 18 BRPM | SYSTOLIC BLOOD PRESSURE: 130 MMHG | BODY MASS INDEX: 30.12 KG/M2

## 2023-02-17 DIAGNOSIS — L70.0 ACNE VULGARIS: ICD-10-CM

## 2023-02-17 DIAGNOSIS — L24.4 IRRITANT CONTACT DERMATITIS DUE TO DRUG IN CONTACT WITH SKIN: Primary | ICD-10-CM

## 2023-02-17 DIAGNOSIS — J31.0 RHINITIS, UNSPECIFIED TYPE: ICD-10-CM

## 2023-02-17 DIAGNOSIS — Z00.00 HEALTHCARE MAINTENANCE: ICD-10-CM

## 2023-02-17 PROBLEM — R03.0 ELEVATED BP WITHOUT DIAGNOSIS OF HYPERTENSION: Status: ACTIVE | Noted: 2023-02-17

## 2023-02-17 RX ORDER — MONTELUKAST SODIUM 10 MG/1
10 TABLET ORAL NIGHTLY
Qty: 90 TABLET | Refills: 0 | Status: SHIPPED | OUTPATIENT
Start: 2023-02-17

## 2023-02-17 RX ORDER — FLUTICASONE PROPIONATE 50 MCG
2 SPRAY, SUSPENSION (ML) NASAL DAILY
Qty: 48 G | Refills: 1 | Status: SHIPPED | OUTPATIENT
Start: 2023-02-17

## 2023-02-17 RX ORDER — LEVOCETIRIZINE DIHYDROCHLORIDE 5 MG/1
5 TABLET, FILM COATED ORAL NIGHTLY
Qty: 30 TABLET | Refills: 3 | Status: SHIPPED | OUTPATIENT
Start: 2023-02-17

## 2023-02-17 SDOH — ECONOMIC STABILITY: FOOD INSECURITY: WITHIN THE PAST 12 MONTHS, THE FOOD YOU BOUGHT JUST DIDN'T LAST AND YOU DIDN'T HAVE MONEY TO GET MORE.: NEVER TRUE

## 2023-02-17 SDOH — ECONOMIC STABILITY: INCOME INSECURITY: HOW HARD IS IT FOR YOU TO PAY FOR THE VERY BASICS LIKE FOOD, HOUSING, MEDICAL CARE, AND HEATING?: NOT HARD AT ALL

## 2023-02-17 SDOH — ECONOMIC STABILITY: FOOD INSECURITY: WITHIN THE PAST 12 MONTHS, YOU WORRIED THAT YOUR FOOD WOULD RUN OUT BEFORE YOU GOT MONEY TO BUY MORE.: NEVER TRUE

## 2023-02-17 ASSESSMENT — ENCOUNTER SYMPTOMS
PHOTOPHOBIA: 0
EYE REDNESS: 1
RHINORRHEA: 0
SHORTNESS OF BREATH: 1
BACK PAIN: 0
WHEEZING: 0
ABDOMINAL PAIN: 0
DIARRHEA: 0
SINUS PRESSURE: 0
EYE DISCHARGE: 0
COUGH: 0
VOMITING: 0
CONSTIPATION: 0
SORE THROAT: 1
EYE ITCHING: 1
SINUS PAIN: 0
CHEST TIGHTNESS: 0
EYE PAIN: 0
NAUSEA: 0

## 2023-02-17 ASSESSMENT — ANXIETY QUESTIONNAIRES
7. FEELING AFRAID AS IF SOMETHING AWFUL MIGHT HAPPEN: 0
4. TROUBLE RELAXING: 0
6. BECOMING EASILY ANNOYED OR IRRITABLE: 0
2. NOT BEING ABLE TO STOP OR CONTROL WORRYING: 0
1. FEELING NERVOUS, ANXIOUS, OR ON EDGE: 0
3. WORRYING TOO MUCH ABOUT DIFFERENT THINGS: 0
5. BEING SO RESTLESS THAT IT IS HARD TO SIT STILL: 0
GAD7 TOTAL SCORE: 0

## 2023-02-17 ASSESSMENT — PATIENT HEALTH QUESTIONNAIRE - PHQ9
DEPRESSION UNABLE TO ASSESS: FUNCTIONAL CAPACITY MOTIVATION LIMITS ACCURACY
2. FEELING DOWN, DEPRESSED OR HOPELESS: 0
4. FEELING TIRED OR HAVING LITTLE ENERGY: 0
SUM OF ALL RESPONSES TO PHQ QUESTIONS 1-9: 0
SUM OF ALL RESPONSES TO PHQ QUESTIONS 1-9: 0
7. TROUBLE CONCENTRATING ON THINGS, SUCH AS READING THE NEWSPAPER OR WATCHING TELEVISION: 0
6. FEELING BAD ABOUT YOURSELF - OR THAT YOU ARE A FAILURE OR HAVE LET YOURSELF OR YOUR FAMILY DOWN: 0
10. IF YOU CHECKED OFF ANY PROBLEMS, HOW DIFFICULT HAVE THESE PROBLEMS MADE IT FOR YOU TO DO YOUR WORK, TAKE CARE OF THINGS AT HOME, OR GET ALONG WITH OTHER PEOPLE: NOT DIFFICULT AT ALL
3. TROUBLE FALLING OR STAYING ASLEEP: 0
SUM OF ALL RESPONSES TO PHQ QUESTIONS 1-9: 0
SUM OF ALL RESPONSES TO PHQ QUESTIONS 1-9: 0
8. MOVING OR SPEAKING SO SLOWLY THAT OTHER PEOPLE COULD HAVE NOTICED. OR THE OPPOSITE, BEING SO FIGETY OR RESTLESS THAT YOU HAVE BEEN MOVING AROUND A LOT MORE THAN USUAL: 0
9. THOUGHTS THAT YOU WOULD BE BETTER OFF DEAD, OR OF HURTING YOURSELF: 0
5. POOR APPETITE OR OVEREATING: 0

## 2023-02-17 NOTE — PROGRESS NOTES
[address] and [facility]  [address] [facility], [address] [place] [address],[address], [place]  [address]  Phone: [phone]      Name:  Shirley Crigler  :    2000    Consultants:   Patient Care Team:  Justin Odom MD as PCP - General (Family Medicine)    Chief Complaint:     Shirley Crigler is a 26 y.o. female who presents today for an established patient care visit with [address] [place] [address] as noted below. HPI:     Shirley Crigler is a 26 y.o. female with a past medical history of Asthma, allergic rhinitis, back pain, sinus tachycardia, elevated BP without HTN, headaches presents for acute concern of rash. Rash  - Reports 3 days ago \"small bumps\" that are itchy appeared on her upper back and over her shoulders. Reports they turn red when scratching  - Admits to a few days prior (~ 1 week ago) patient used a new product: [address] - acne fighting body spray 1x. This an aerosolized can.  Contains 2% salicylic acid + cucumber, niacinamide, & aloe  - Notably, patient reports she has used salicyclic acid, niacinamide, aloe, and cucumber products in past without any known allergies  - Patient otherwise has no recent travel, exposure to new pets, or used other new products  - She has not tried any creams for help, was too concerned for further reactions  - Patient has not used her Claritin this week    Acne Vulgaris  - Patient has used salicylcic acid and adapalene on facial acne  - She is concerned over acne that has risen on her upper back and over her chest that she used the spray for as above  - Has not used body washes containing ingredients such as benzoyl peroxide    Allergic Rhinitis   - Patient last OV complained of runny nose and drainage to the back of her throat  - Was prescribed montelukast, fluticasone, and levocetrizine but due to pharmacy mix-up, was not able to get the prescriptions appropriately  - Her symptoms have persisted, but not progressed    Healthcare Maintenance  - Due for HPV 2 doses, Covid Booster, Flu and Tdap  - Due for Pap smear  - Patient has been off of birth control since September per her choice, was annoyed with having to take a daily pill. She is in a committed relationship with a male partner and reports using condoms as they are not family planning  - Patient has had 2 lifetime partners, no hx of STD/STIs      Patient Active Problem List   Diagnosis    Family history of early CAD    Chronic left-sided thoracic back pain    Palpitations    Class 1 obesity without serious comorbidity in adult    Visual disturbances    Tachycardia    Acne vulgaris    Floaters in visual field, bilateral    Mass of wrist, left       Past Medical History:    Past Medical History:   Diagnosis Date    Asthma        Past Surgical History:  Past Surgical History:   Procedure Laterality Date    ADENOIDECTOMY  2004       Home Meds:  Prior to Visit Medications    Medication Sig Taking?  Authorizing Provider   fluticasone (FLONASE) 50 MCG/ACT nasal spray 2 sprays by Each Nostril route daily Yes Maximino Rebollar, DO   montelukast (SINGULAIR) 10 MG tablet Take 1 tablet by mouth nightly Yes Maximino Rebollar, DO   levocetirizine (XYZAL) 5 MG tablet Take 1 tablet by mouth nightly Yes Maximino Delaneyhed, DO   naproxen (NAPROSYN) 500 MG tablet Take 1 tablet by mouth 2 times daily for 20 doses  Patient taking differently: Take 500 mg by mouth 2 times daily as needed  Sarah Yoder PA-C       Allergies:    Motrin [ibuprofen micronized], Clavulanic acid, Ibuprofen, and Penicillins    Family History:       Problem Relation Age of Onset    Alcohol Abuse Mother     Diabetes Maternal Aunt     Hypothyroidism Maternal Aunt     High Blood Pressure Maternal Aunt     Heart Attack Maternal Aunt         Mid-50s    Stroke Maternal Uncle     High Cholesterol Maternal Grandfather     Heart Disease Maternal Grandfather        Social History:  Social History       Tobacco History       Smoking Status  Never      Smokeless Tobacco Use  Never              Alcohol History       Alcohol Use Status  No              Drug Use       Drug Use Status  Not Currently              Sexual Activity       Sexually Active  Yes Partners  Male                       Health Maintenance Completed:  Health Maintenance   Topic Date Due    HIV screen  Never done    HPV vaccine (2 - 3-dose series) 08/17/2018    Pap smear  Never done    COVID-19 Vaccine (3 - Booster for Pfizer series) 05/17/2022    Flu vaccine (1) 08/01/2022    DTaP/Tdap/Td vaccine (7 - Td or Tdap) 08/15/2022    Chlamydia/GC screen  12/02/2022    Depression Screen  12/07/2023    Hepatitis A vaccine  Completed    Hib vaccine  Completed    Varicella vaccine  Completed    Meningococcal (ACWY) vaccine  Completed    Hepatitis C screen  Completed    Pneumococcal 0-64 years Vaccine  Aged [employer] History   Administered Date(s) Administered    COVID-19, PFIZER GRAY top, DO NOT Dilute, (age 15 y+), IM, 30 mcg/0.3 mL 02/22/2022, 03/22/2022    DTaP (Infanrix) 2001, 2001, 2001    DTaP vaccine 07/24/2002, 09/21/2005    HIB PRP-T (ActHIB, Hiberix) 2001, 2001, 2001    HPV Quadrivalent (Gardasil) 07/20/2018    Hepatitis A Ped/Adol (Havrix, Vaqta) 08/15/2012, 09/22/2014    Hepatitis B 2001, 2001, 2001    Hib (HbOC) 07/24/2002    Influenza Virus Vaccine 10/09/2004, 10/09/2004, 10/30/2008, 10/30/2008, 10/14/2011, 10/14/2011    MMR 11/15/2002, 09/21/2005    Meningococcal B, OMV (Bexsero) 07/20/2018    Meningococcal MCV4P (Menactra) 08/15/2012, 07/20/2018    Pneumococcal Conjugate 7-valent ([employer]) 2001, 2001, 2001, 03/19/2002    Polio IPV (IPOL) 2001, 2001, 07/24/2002, 09/21/2005    Tdap (Boostrix, Adacel) 08/15/2012    Varicella (Varivax) 07/24/2002, 08/11/2006         Review of Systems:  Review of Systems Constitutional:  Negative for chills, diaphoresis, fatigue and fever. HENT:  Positive for congestion, postnasal drip and sore throat. Negative for rhinorrhea, sinus pressure, sinus pain, sneezing and tinnitus. Eyes:  Positive for redness and itching. Respiratory:  Negative for cough, shortness of breath and wheezing. Cardiovascular:  Negative for chest pain and palpitations. Musculoskeletal:  Negative for arthralgias, back pain and myalgias. Skin:  Positive for rash (per HPI). Neurological:  Negative for dizziness, light-headedness and headaches. Psychiatric/Behavioral: Negative. Negative for dysphoric mood. The patient is not nervous/anxious. Physical Exam:   Vitals:    02/17/23 1044   BP: 130/72   Site: Left Upper Arm   Position: Sitting   Cuff Size: Large Adult   Pulse: 75   Resp: 18   Temp: 97.6 °F (36.4 °C)   TempSrc: Temporal   SpO2: 98%   Weight: 149 lb 6.4 oz (67.8 kg)   Height: 4' 11\" (1.499 m)     Body mass index is 30.18 kg/m². Wt Readings from Last 3 Encounters:   02/17/23 149 lb 6.4 oz (67.8 kg)   12/07/22 147 lb (66.7 kg)   11/23/22 148 lb 6.4 oz (67.3 kg)       BP Readings from Last 3 Encounters:   02/17/23 130/72   12/07/22 126/80   11/23/22 130/82       Physical Exam  Constitutional:       General: She is not in acute distress. HENT:      Head: Normocephalic and atraumatic. Eyes:      Extraocular Movements: Extraocular movements intact. Conjunctiva/sclera: Conjunctivae normal.      Pupils: Pupils are equal, round, and reactive to light. Cardiovascular:      Rate and Rhythm: Normal rate and regular rhythm. Pulses: Normal pulses. Heart sounds: Normal heart sounds. Pulmonary:      Effort: Pulmonary effort is normal.      Breath sounds: Normal breath sounds. Skin:     Findings: Rash (papular over upper back, bilateral shoulders, and part of upper chest) present. No erythema. Neurological:      General: No focal deficit present.       Mental Status: She is alert and oriented to person, place, and time. Psychiatric:         Mood and Affect: Mood normal.         Behavior: Behavior normal.          Lab Review: not applicable       Assessment/Plan:  Shawn Escobar is a 26 y.o. female with a Hx of Asthma, allergic rhinitis, back pain, sinus tachycardia, elevated BP without HTN, headaches presents for acute concern of rash. Irritant contact dermatitis due to drug in contact with skin  - Likely contact dermatitis due to known initial use of a product. Patient reports using some ingredient of product prior (salicyclic acid, niacinamide, aloe, and cucumber) without issue, likely another ingredient in there aerosilization causing irritation  - Patient advised to discontinue use  - Consider use of calamine lotion to help calm/soothe skin  - Follow-up prn if no relief    Rhinitis, unspecified type  - Not at goal  - Will represcribe medications to from last visit to try  - Discontinue use of claritin  - Follow-up if no relief  -     fluticasone (FLONASE) 50 MCG/ACT nasal spray; 2 sprays by Each Nostril route daily  -     levocetirizine (XYZAL) 5 MG tablet; Take 1 tablet by mouth nightly  -     montelukast (SINGULAIR) 10 MG tablet; Take 1 tablet by mouth nightly    Acne vulgaris  - Not at goal  - Recommend patient use body wash containing ingredients to fight acne such as salicylic acid or benzoyl peroxide  - Provided with names of ingredients to find OTC body wash  - Follow-up in 1-2 months if no relief or worsening symptoms    Healthcare maintenance  Patient admits to a fear of needles  I counseled parent and patient about the influenza, HPV, Tdap, and Covid vaccines, including effectiveness, side effects, and the diseases they prevent. They had the opportunity to ask questions and decided to not get vaccinated today. Will consider in the future  Pap smear never done, recommend scheduling pap in next month with me.  Advised on benefits and risks of not getting procedure       Health Maintenance Due:  Health Maintenance Due   Topic Date Due    HIV screen  Never done    HPV vaccine (2 - 3-dose series) 08/17/2018    Pap smear  Never done    COVID-19 Vaccine (3 - Booster for Pfizer series) 05/17/2022    Flu vaccine (1) 08/01/2022    DTaP/Tdap/Td vaccine (7 - Td or Tdap) 08/15/2022    Chlamydia/GC screen  12/02/2022        Health care decision maker:  <72years old    Health Maintenance: (USPSTF Recommendations)  (F) Breast Cancer Screen: (40-49 (C), 50-74 biennial screening mammogram (B)):   (F) Cervical Cancer Screen: (21-29 q3yr cytology alone; 30-65 q3yr cytology alone, q5yr with hrHPV alone, or q5yr cytology+hrHPV (A)): HIV Screen: (16-65 yr old, and all pregnant patients (A)): Hep C Screen: (18-79 yr old (B)):  HCC Screen: (all pts with cirrhosis and high risk Hep B (US q6 mo)):  Immunizations: [unfilled]      RTC:  Return in about 1 month (around 3/17/2023) for pap smear. EMR Dragon/transcription disclaimer:  Much of this encounter note is electronic transcription/translation of spoken language to printed texts. The electronic translation of spoken language may be erroneous, or at times, nonsensical words or phrases may be inadvertently transcribed.   Although I have reviewed the note for such errors, some may still exist.       Therese Sorensen MD, PGY-2  [facility]   2/17/2023

## 2023-02-17 NOTE — PATIENT INSTRUCTIONS
Find a body wash with benzoyl peroxide to be used for your skin concerns. .     Please consider the following vaccines at a later visit:  - Tdap - this is recommended for all ages, especially if exposed to babies  - HPV (2 more doses) - prevents cervical cance  - Covid booster  - Flu

## 2023-02-17 NOTE — PROGRESS NOTES
[address] and [facility]                                             [address] [facility], [address] [place] [address], [address] [place] [address]        Phone: [phone]      Name:  Fernando Harper  :    2000    Consultants:   Patient Care Team:  Bridget De Leon MD as PCP - General (Family Medicine)    Chief Complaint:     Fernando Harper is a 26 y.o. female  who presents today for an established patient care visit with [address] [place] [address] as noted below. HPI:     Patient is a 25year old Female with past medical history of Asthma, back pain, sinus tachycardia, elevated BP without HTN, headaches. Patient presents in office today for acute visit of persistent congestion for >2 weeks duration, sore throat, and fatigue. Patient denies any chest pain, nausea, vomiting, or diarrhea. Upper Respiratory Infection vs. Acute Rhinosinusitis   Patient states that in 2022 she had positive rapid strep test.  Given patient's penicillin allergy she was started on azithromycin. Patient states that she had good clinical improvement afterwards. Patient states that approximately 13 [facility] [place] [address] [facility] Patient states that she had positive rapid strep at that visit and was given Keflex given recurrence in less than 30 days. Pt successfully completed course with mild to moderate improvement in sx. Patient used Afrin nasal spray for 3 days without relief. Patient states that she continues to use OTC eyedrops and Mucinex. Patient continues to complain of eye itchiness, headache, sore throat, \"stuffy nose\" and congestion, chills, SOB/CONTRERAS, palpitations (recent dx of Sinus Tachy). Patient states that she has complaints of left eye conjunctivitis that seems more erythematous compared to right without purulent discharge at time of visit today. Patient denies any sinus pressure/pain. Patient states that she has difficulty popping her ears and equalizing pressure. Patient states that she has had chills occasionally but has been without fever. Sinus Tachycardia   1/25/2023: echo test shows normal heart function, no new orders or changes at this time. Symptoms initially started when patient went to the ED on 11/10/2022 for low back pain that radiated to the chest on left side. EKG showed sinus tachycardia and chest x-ray was negative for acute pulmonary processes. D-dimer and troponins were negative. Patient was discharged on lidocaine patch and naproxen as needed for pain. At that time patient was noted to have history of intermittent palpitations for the last several years that usually self resolved in 5 minutes. Patient states that she saw Dr. Keary Holstein approximately 1 week ago (11/15/2022). Patient states that at that visit echo was ordered in addition to exercise stress test and 2-week cardiac monitor. Patient states that she needs to  cardiac monitor and will schedule echo and exercise stress test after Thanksgiving. BMP, CBC, urine pregnancy were ordered at the visit. Patient was scheduled to follow-up with cardiology in 3 months. Need to schedule PAP    Patient Active Problem List   Diagnosis    Family history of early CAD    Chronic left-sided thoracic back pain    Palpitations    Class 1 obesity without serious comorbidity in adult    Visual disturbances    Tachycardia    Acne vulgaris    Floaters in visual field, bilateral    Mass of wrist, left         Past Medical History:    Past Medical History:   Diagnosis Date    Asthma        Past Surgical History:  Past Surgical History:   Procedure Laterality Date    ADENOIDECTOMY  2004       Home Meds:  Prior to Visit Medications    Medication Sig Taking?  Authorizing Provider   fluticasone (FLONASE) 50 MCG/ACT nasal spray 2 sprays by Each Nostril route daily  Maximino Rebollar,    montelukast (SINGULAIR) 10 MG tablet Take 1 tablet by mouth nightly  Maximino Haleeb Lesley-Rached, DO   levocetirizine (XYZAL) 5 MG tablet Take 1 tablet by mouth nightly  Maximino Haleeb Lesley-Rached, DO   naproxen (NAPROSYN) 500 MG tablet Take 1 tablet by mouth 2 times daily for 20 doses  Patient taking differently: Take 500 mg by mouth 2 times daily as needed  Sarah Yoder PA-C       Allergies:    Motrin [ibuprofen micronized], Clavulanic acid, Ibuprofen, and Penicillins    Family History:       Problem Relation Age of Onset    Alcohol Abuse Mother     Diabetes Maternal Aunt     Hypothyroidism Maternal Aunt     High Blood Pressure Maternal Aunt     Heart Attack Maternal Aunt         Mid-50s    Stroke Maternal Uncle     High Cholesterol Maternal Grandfather     Heart Disease Maternal Grandfather          Health Maintenance Completed:  Health Maintenance   Topic Date Due    HIV screen  Never done    HPV vaccine (2 - 3-dose series) 08/17/2018    Pap smear  Never done    COVID-19 Vaccine (3 - Booster for Pfizer series) 05/17/2022    Flu vaccine (1) 08/01/2022    DTaP/Tdap/Td vaccine (7 - Td or Tdap) 08/15/2022    Chlamydia/GC screen  12/02/2022    Depression Screen  12/07/2023    Hepatitis A vaccine  Completed    Hib vaccine  Completed    Varicella vaccine  Completed    Meningococcal (ACWY) vaccine  Completed    Hepatitis C screen  Completed    Pneumococcal 0-64 years Vaccine  Aged [employer] History   Administered Date(s) Administered    COVID-19, PFIZER GRAY top, DO NOT Dilute, (age 15 y+), IM, 30 mcg/0.3 mL 02/22/2022, 03/22/2022    DTaP (Infanrix) 2001, 2001, 2001    DTaP vaccine 07/24/2002, 09/21/2005    HIB PRP-T (ActHIB, Hiberix) 2001, 2001, 2001    HPV Quadrivalent (Gardasil) 07/20/2018    Hepatitis A Ped/Adol (Havrix, Vaqta) 08/15/2012, 09/22/2014    Hepatitis B 2001, 2001, 2001    Hib (HbOC) 07/24/2002    Influenza Virus Vaccine 10/09/2004, 10/09/2004, 10/30/2008, 10/30/2008, 10/14/2011, 10/14/2011    MMR 11/15/2002, 09/21/2005    Meningococcal B, OMV (Bexsero) 07/20/2018    Meningococcal MCV4P (Menactra) 08/15/2012, 07/20/2018    Pneumococcal Conjugate 7-valent (Maria Alejandra Ego) 2001, 2001, 2001, 03/19/2002    Polio IPV (IPOL) 2001, 2001, 07/24/2002, 09/21/2005    Tdap (Boostrix, Adacel) 08/15/2012    Varicella (Varivax) 07/24/2002, 08/11/2006         Review of Systems:  Review of Systems   Constitutional:  Positive for chills. Negative for diaphoresis, fatigue and fever. HENT:  Positive for congestion, postnasal drip and sore throat. Negative for rhinorrhea, sinus pressure, sinus pain, sneezing and tinnitus. Eyes:  Positive for redness and itching. Negative for photophobia, pain, discharge and visual disturbance. Respiratory:  Positive for shortness of breath. Negative for cough, chest tightness and wheezing. Cardiovascular:  Negative for chest pain and palpitations. Gastrointestinal:  Negative for abdominal pain, constipation, diarrhea, nausea and vomiting. Musculoskeletal:  Negative for arthralgias, back pain and myalgias. Skin: Negative. Neurological:  Negative for dizziness, tremors, seizures, syncope, weakness, light-headedness, numbness and headaches. Psychiatric/Behavioral: Negative. Physical Exam:   There were no vitals filed for this visit. There is no height or weight on file to calculate BMI. Wt Readings from Last 3 Encounters:   12/07/22 147 lb (66.7 kg)   11/23/22 148 lb 6.4 oz (67.3 kg)   11/15/22 147 lb (66.7 kg)       BP Readings from Last 3 Encounters:   12/07/22 126/80   11/23/22 130/82   11/15/22 120/72       Physical Exam  Constitutional:       General: She is not in acute distress. Appearance: Normal appearance. She is not ill-appearing or toxic-appearing. HENT:      Head: Normocephalic and atraumatic. Right Ear: Tympanic membrane, ear canal and external ear normal. There is no impacted cerumen. Left Ear: Tympanic membrane, ear canal and external ear normal. There is no impacted cerumen. Nose: Congestion present. No rhinorrhea. Mouth/Throat:      Mouth: Mucous membranes are moist.      Pharynx: Oropharynx is clear. No oropharyngeal exudate or posterior oropharyngeal erythema. Eyes:      General:         Left eye: Discharge present. Extraocular Movements: Extraocular movements intact. Pupils: Pupils are equal, round, and reactive to light. Comments: L eye conjunctivitis with no present purulent discharge, with mild crusting and clear discharge after sneezing. There is no sae-orbital or orbital edema or eyelid swelling noted. Cardiovascular:      Rate and Rhythm: Normal rate and regular rhythm. Pulses: Normal pulses. Heart sounds: Normal heart sounds. No murmur heard. No friction rub. No gallop. Pulmonary:      Effort: Pulmonary effort is normal. No respiratory distress. Breath sounds: Normal breath sounds. No stridor. No wheezing, rhonchi or rales. Musculoskeletal:         General: No deformity or signs of injury. Cervical back: Neck supple. Right lower leg: No edema. Left lower leg: No edema. Skin:     General: Skin is warm and dry. Capillary Refill: Capillary refill takes less than 2 seconds. Neurological:      General: No focal deficit present. Mental Status: She is alert. Mental status is at baseline.    Psychiatric:         Mood and Affect: Mood normal.         Behavior: Behavior normal.            Lab Review:   Hospital Outpatient Visit on 01/25/2023   Component Date Value    Left Ventricular Ejectio* 01/25/2023 55     LVEF MODALITY 01/25/2023 ECHO    Office Visit on 11/23/2022   Component Date Value    SARS-CoV-2 11/23/2022 Not Detected     Influenza A Ab 11/23/2022 Neg     Influenza B Ab 11/23/2022 [facility] Outpatient Visit on 11/14/2022   Component Date Value    Hemoglobin A1C 11/14/2022 4.9     eAG 11/14/2022 93.9     Hep C Ab Interp 11/14/2022 Non-reactive     TSH 11/14/2022 2.78     Cholesterol, Total 11/14/2022 168     Triglycerides 11/14/2022 72     HDL 11/14/2022 36 (A)     LDL Calculated 11/14/2022 118 (A)     VLDL Cholesterol Calcula* 11/14/2022 14     Misc Test Order 11/14/2022 SEE NOTE    Admission on 11/10/2022, Discharged on 11/10/2022   Component Date Value    Ventricular Rate 11/10/2022 106     Atrial Rate 11/10/2022 106     P-R Interval 11/10/2022 160     QRS Duration 11/10/2022 92     Q-T Interval 11/10/2022 336     QTc Calculation (Bazett) 11/10/2022 446     P Axis 11/10/2022 59     R Axis 11/10/2022 53     T Axis 11/10/2022 42     Diagnosis 11/10/2022 Sinus tachycardiaPossible Left atrial enlargementBorderline ECGNo previous ECGs availableConfirmed by Mana Aguirre MD, Alfreda Arora ([id number]) on 11/12/2022 2:26:06 PM     WBC 11/10/2022 12.2 (A)     RBC 11/10/2022 4.69     Hemoglobin 11/10/2022 14.1     Hematocrit 11/10/2022 41.8     MCV 11/10/2022 89.0     MCH 11/10/2022 30.0     MCHC 11/10/2022 33.7     RDW 11/10/2022 12.3 (A)     Platelets 52/92/4541 289     MPV 11/10/2022 8.2     Neutrophils % 11/10/2022 70.9     Lymphocytes % 11/10/2022 19.7     Monocytes % 11/10/2022 7.0     Eosinophils % 11/10/2022 1.8     Basophils % 11/10/2022 0.6     Neutrophils Absolute 11/10/2022 8.7 (A)     Lymphocytes Absolute 11/10/2022 2.4     Monocytes Absolute 11/10/2022 0.9     Eosinophils Absolute 11/10/2022 0.2     Basophils Absolute 11/10/2022 0.1     Sodium 11/10/2022 135 (A)     Potassium reflex Magnesi* 11/10/2022 3.4 (A)     Chloride 11/10/2022 100     CO2 11/10/2022 25     Anion Gap 11/10/2022 10     Glucose 11/10/2022 107 (A)     BUN 11/10/2022 7     Creatinine 11/10/2022 0.6     Est, Glom Filt Rate 11/10/2022 >60     Calcium 11/10/2022 9.3     Total Protein 11/10/2022 8.0     Albumin 11/10/2022 4.7     Albumin/Globulin Ratio 11/10/2022 1.4     Total Bilirubin 11/10/2022 0.3     Alkaline Phosphatase 11/10/2022 88     ALT 11/10/2022 17     AST 11/10/2022 17     Troponin 11/10/2022 <0.01     hCG Qual 11/10/2022 Negative     D-Dimer, Quant 11/10/2022 0.40     Magnesium 11/10/2022 2.00           Assessment/Plan:  There are no diagnoses linked to this encounter. Patient is a 25year old Female with past medical history of Asthma, back pain, sinus tachycardia, elevated BP without HTN, headaches. Patient presents in office today to follow-up on persistent congestion for >2 weeks duration, sore throat, and fatigue. Upper Respiratory Infection vs. Acute Rhinosinusitis    Not controlled, new complaint today. Patient without exudates/erythema of the throat, without uvular enlargement, without sinus pain/pressure. Patient with previous positive rapid strep test on 11/10/2022 with initial improvement in symptoms after antibiotics. DDx at this time likely viral (self resolving) versus bacterial URI vs. Acute Rhinosinusitis in nature. No signs of bacterial infection as noted above. These were reviewed with patient in-office. OTC meds (listed below) for symptomatic relief. Patient can continue OTC Mucinex for congestion and Tylenol for fever/pain relief (without concurrent use with naproxen). I advised patient on adequate fluid hydration and bedrest.  Patient can continue saline nasal spray at this time. Consider starting Flonase by next week if symptoms have not improved. Ordered COVID testing and rapid flu today. Rapid flu was negative for influenza A/B. Patient previously had adenoidectomy with unknown status of tonsils per pt hx. Marlin tonsils likely removed based on physical exam today. Will prescribe Vigamox (moxifloxacin) 0.5% ophthalmic solution TID for 7 days for left eye erythema/conjunctivitis given ongoing symptoms. Given clear discharge present today likely viral vs. Bacterial in nature though. RTC as needed or by next Monday if symptoms worsen or do not improve.     Sinus Tachycardia    Not controlled, not at goal.  Patient continues to have persistent palpitations daily. Heart rate at 90 in office today. Borderline tachycardic on exam.  Patient currently being managed and followed by cardiology. Encourage patient to obtain echo and exercise stress test after Thanksgiving. Encourage patient to wear 2-week cardiac monitor. Encourage patient to follow-up with cardiology in 3 months. Health Maintenance Due:  Health Maintenance Due   Topic Date Due    HIV screen  Never done    HPV vaccine (2 - 3-dose series) 08/17/2018    Pap smear  Never done    COVID-19 Vaccine (3 - Booster for Pfizer series) 05/17/2022    Flu vaccine (1) 08/01/2022    DTaP/Tdap/Td vaccine (7 - Td or Tdap) 08/15/2022    Chlamydia/GC screen  12/02/2022          Health care decision maker:  <72years old      Health Maintenance: (USPSTF Recommendations)  (F) Breast Cancer Screen: (40-49 (C), 50-74 biennial screening mammogram (B))  (F) Cervical Cancer Screen: (21-29 q3yr cytology alone; 30-65 q3yr cytology alone, q5yr with hrHPV alone, or q5yr cytology+hrHPV (A))  CRC/Colonoscopy Screening: (adults 45-49 (B), 50-75 (A))  Lung Ca Screening: Annual LDCT (+smoker age 49-80, smoked within 15 years, total of 20 pack yr history (B)):  DEXA Screen: (women >65 and older, <65 if at risk/postmenopausal (B))  HIV Screen: (16-65 yr old, and all pregnant patients (A)): Hep C Screen: (18-79 yr old (B)):  HCC Screen: (all pts with cirrhosis and high risk Hep B (US q6 mo)):  Immunizations:    RTC:  No follow-ups on file. EMR Dragon/transcription disclaimer:  Much of this encounter note is electronic transcription/translation of spoken language to printed texts. The electronic translation of spoken language may be erroneous, or at times, nonsensical words or phrases may be inadvertently transcribed.   Although I have reviewed the note for such errors, some may still exist.

## 2023-02-18 ASSESSMENT — ENCOUNTER SYMPTOMS
RHINORRHEA: 0
EYE REDNESS: 1
EYE ITCHING: 1
BACK PAIN: 0
SHORTNESS OF BREATH: 0
WHEEZING: 0
SORE THROAT: 1
SINUS PAIN: 0
COUGH: 0
SINUS PRESSURE: 0

## 2023-02-23 ENCOUNTER — APPOINTMENT (OUTPATIENT)
Dept: GENERAL RADIOLOGY | Age: 23
End: 2023-02-23
Payer: MEDICAID

## 2023-02-23 ENCOUNTER — APPOINTMENT (OUTPATIENT)
Dept: CT IMAGING | Age: 23
End: 2023-02-23
Payer: MEDICAID

## 2023-02-23 ENCOUNTER — HOSPITAL ENCOUNTER (EMERGENCY)
Age: 23
Discharge: HOME | End: 2023-02-23
Attending: EMERGENCY MEDICINE
Payer: MEDICAID

## 2023-02-23 ENCOUNTER — APPOINTMENT (OUTPATIENT)
Dept: ULTRASOUND IMAGING | Age: 23
End: 2023-02-23
Payer: MEDICAID

## 2023-02-23 VITALS
OXYGEN SATURATION: 99 % | HEIGHT: 59 IN | TEMPERATURE: 98.9 F | DIASTOLIC BLOOD PRESSURE: 79 MMHG | WEIGHT: 140 LBS | BODY MASS INDEX: 28.22 KG/M2 | HEART RATE: 95 BPM | SYSTOLIC BLOOD PRESSURE: 133 MMHG | RESPIRATION RATE: 18 BRPM

## 2023-02-23 DIAGNOSIS — R00.0 TACHYCARDIA: ICD-10-CM

## 2023-02-23 DIAGNOSIS — M54.9 CHRONIC UPPER BACK PAIN: Primary | ICD-10-CM

## 2023-02-23 DIAGNOSIS — G89.29 CHRONIC UPPER BACK PAIN: Primary | ICD-10-CM

## 2023-02-23 LAB
A/G RATIO: 1.5 (ref 1.1–2.2)
ALBUMIN SERPL-MCNC: 4.7 G/DL (ref 3.4–5)
ALP BLD-CCNC: 78 U/L (ref 40–129)
ALT SERPL-CCNC: 14 U/L (ref 10–40)
ANION GAP SERPL CALCULATED.3IONS-SCNC: 10 MMOL/L (ref 3–16)
AST SERPL-CCNC: 18 U/L (ref 15–37)
BASOPHILS ABSOLUTE: 0.1 K/UL (ref 0–0.2)
BASOPHILS RELATIVE PERCENT: 0.4 %
BILIRUB SERPL-MCNC: <0.2 MG/DL (ref 0–1)
BILIRUBIN URINE: NEGATIVE
BLOOD, URINE: NEGATIVE
BUN BLDV-MCNC: 12 MG/DL (ref 7–20)
CALCIUM SERPL-MCNC: 9.7 MG/DL (ref 8.3–10.6)
CHLORIDE BLD-SCNC: 102 MMOL/L (ref 99–110)
CLARITY: CLEAR
CO2: 24 MMOL/L (ref 21–32)
COLOR: NORMAL
CREAT SERPL-MCNC: 0.6 MG/DL (ref 0.6–1.1)
D DIMER: 0.31 UG/ML FEU (ref 0–0.6)
EKG ATRIAL RATE: 107 BPM
EKG DIAGNOSIS: NORMAL
EKG P AXIS: 60 DEGREES
EKG P-R INTERVAL: 160 MS
EKG Q-T INTERVAL: 326 MS
EKG QRS DURATION: 88 MS
EKG QTC CALCULATION (BAZETT): 435 MS
EKG R AXIS: 44 DEGREES
EKG T AXIS: 46 DEGREES
EKG VENTRICULAR RATE: 107 BPM
EOSINOPHILS ABSOLUTE: 0.1 K/UL (ref 0–0.6)
EOSINOPHILS RELATIVE PERCENT: 1 %
GFR SERPL CREATININE-BSD FRML MDRD: >60 ML/MIN/{1.73_M2}
GLUCOSE BLD-MCNC: 102 MG/DL (ref 70–99)
GLUCOSE URINE: NEGATIVE MG/DL
HCG QUALITATIVE: NEGATIVE
HCT VFR BLD CALC: 41.9 % (ref 36–48)
HEMOGLOBIN: 14.5 G/DL (ref 12–16)
KETONES, URINE: NEGATIVE MG/DL
LACTIC ACID: 0.7 MMOL/L (ref 0.4–2)
LEUKOCYTE ESTERASE, URINE: NEGATIVE
LYMPHOCYTES ABSOLUTE: 2.5 K/UL (ref 1–5.1)
LYMPHOCYTES RELATIVE PERCENT: 19.1 %
MCH RBC QN AUTO: 30.7 PG (ref 26–34)
MCHC RBC AUTO-ENTMCNC: 34.6 G/DL (ref 31–36)
MCV RBC AUTO: 89 FL (ref 80–100)
MICROSCOPIC EXAMINATION: NORMAL
MONOCYTES ABSOLUTE: 0.8 K/UL (ref 0–1.3)
MONOCYTES RELATIVE PERCENT: 5.9 %
NEUTROPHILS ABSOLUTE: 9.8 K/UL (ref 1.7–7.7)
NEUTROPHILS RELATIVE PERCENT: 73.6 %
NITRITE, URINE: NEGATIVE
PDW BLD-RTO: 12.8 % (ref 12.4–15.4)
PH UA: 7.5 (ref 5–8)
PLATELET # BLD: 296 K/UL (ref 135–450)
PMV BLD AUTO: 8.4 FL (ref 5–10.5)
POTASSIUM REFLEX MAGNESIUM: 3.7 MMOL/L (ref 3.5–5.1)
PROTEIN UA: NEGATIVE MG/DL
RBC # BLD: 4.71 M/UL (ref 4–5.2)
SODIUM BLD-SCNC: 136 MMOL/L (ref 136–145)
SPECIFIC GRAVITY UA: 1.01 (ref 1–1.03)
TOTAL PROTEIN: 7.8 G/DL (ref 6.4–8.2)
URINE REFLEX TO CULTURE: NORMAL
URINE TYPE: NORMAL
UROBILINOGEN, URINE: 0.2 E.U./DL
WBC # BLD: 13.3 K/UL (ref 4–11)

## 2023-02-23 PROCEDURE — 85379 FIBRIN DEGRADATION QUANT: CPT

## 2023-02-23 PROCEDURE — 76705 ECHO EXAM OF ABDOMEN: CPT

## 2023-02-23 PROCEDURE — 80053 COMPREHEN METABOLIC PANEL: CPT

## 2023-02-23 PROCEDURE — 93005 ELECTROCARDIOGRAM TRACING: CPT | Performed by: EMERGENCY MEDICINE

## 2023-02-23 PROCEDURE — 84703 CHORIONIC GONADOTROPIN ASSAY: CPT

## 2023-02-23 PROCEDURE — 96361 HYDRATE IV INFUSION ADD-ON: CPT

## 2023-02-23 PROCEDURE — 6360000004 HC RX CONTRAST MEDICATION: Performed by: NURSE PRACTITIONER

## 2023-02-23 PROCEDURE — 96360 HYDRATION IV INFUSION INIT: CPT

## 2023-02-23 PROCEDURE — 81003 URINALYSIS AUTO W/O SCOPE: CPT

## 2023-02-23 PROCEDURE — 71046 X-RAY EXAM CHEST 2 VIEWS: CPT

## 2023-02-23 PROCEDURE — 93010 ELECTROCARDIOGRAM REPORT: CPT | Performed by: INTERNAL MEDICINE

## 2023-02-23 PROCEDURE — 36415 COLL VENOUS BLD VENIPUNCTURE: CPT

## 2023-02-23 PROCEDURE — 6370000000 HC RX 637 (ALT 250 FOR IP): Performed by: NURSE PRACTITIONER

## 2023-02-23 PROCEDURE — 85025 COMPLETE CBC W/AUTO DIFF WBC: CPT

## 2023-02-23 PROCEDURE — 99285 EMERGENCY DEPT VISIT HI MDM: CPT

## 2023-02-23 PROCEDURE — 71260 CT THORAX DX C+: CPT | Performed by: NURSE PRACTITIONER

## 2023-02-23 PROCEDURE — 83605 ASSAY OF LACTIC ACID: CPT

## 2023-02-23 PROCEDURE — 2580000003 HC RX 258: Performed by: NURSE PRACTITIONER

## 2023-02-23 RX ORDER — ACETAMINOPHEN 325 MG/1
650 TABLET ORAL ONCE
Status: COMPLETED | OUTPATIENT
Start: 2023-02-23 | End: 2023-02-23

## 2023-02-23 RX ORDER — LIDOCAINE 50 MG/G
1 PATCH TOPICAL DAILY
Qty: 10 PATCH | Refills: 0 | Status: SHIPPED | OUTPATIENT
Start: 2023-02-23 | End: 2023-03-05

## 2023-02-23 RX ORDER — MECLIZINE HCL 12.5 MG/1
25 TABLET ORAL ONCE
Status: CANCELLED | OUTPATIENT
Start: 2023-02-23 | End: 2023-02-23

## 2023-02-23 RX ORDER — 0.9 % SODIUM CHLORIDE 0.9 %
1000 INTRAVENOUS SOLUTION INTRAVENOUS ONCE
Status: COMPLETED | OUTPATIENT
Start: 2023-02-23 | End: 2023-02-23

## 2023-02-23 RX ORDER — LIDOCAINE 4 G/G
1 PATCH TOPICAL ONCE
Status: DISCONTINUED | OUTPATIENT
Start: 2023-02-23 | End: 2023-02-24 | Stop reason: HOSPADM

## 2023-02-23 RX ORDER — ACETAMINOPHEN 500 MG
500 TABLET ORAL 4 TIMES DAILY PRN
Qty: 28 TABLET | Refills: 0 | Status: SHIPPED | OUTPATIENT
Start: 2023-02-23 | End: 2023-03-02

## 2023-02-23 RX ADMIN — IOPAMIDOL 75 ML: 755 INJECTION, SOLUTION INTRAVENOUS at 20:58

## 2023-02-23 RX ADMIN — SODIUM CHLORIDE 1000 ML: 9 INJECTION, SOLUTION INTRAVENOUS at 19:33

## 2023-02-23 RX ADMIN — ACETAMINOPHEN 650 MG: 325 TABLET ORAL at 21:22

## 2023-02-23 ASSESSMENT — PAIN DESCRIPTION - LOCATION: LOCATION: BACK

## 2023-02-23 ASSESSMENT — PAIN DESCRIPTION - PAIN TYPE: TYPE: ACUTE PAIN

## 2023-02-23 ASSESSMENT — PAIN - FUNCTIONAL ASSESSMENT
PAIN_FUNCTIONAL_ASSESSMENT: 0-10
PAIN_FUNCTIONAL_ASSESSMENT: 0-10

## 2023-02-23 ASSESSMENT — PAIN DESCRIPTION - DESCRIPTORS: DESCRIPTORS: ACHING

## 2023-02-23 ASSESSMENT — PAIN SCALES - GENERAL
PAINLEVEL_OUTOF10: 7
PAINLEVEL_OUTOF10: 3

## 2023-02-23 ASSESSMENT — PAIN DESCRIPTION - ONSET: ONSET: ON-GOING

## 2023-02-23 ASSESSMENT — PAIN DESCRIPTION - FREQUENCY: FREQUENCY: CONTINUOUS

## 2023-02-23 ASSESSMENT — PAIN DESCRIPTION - ORIENTATION: ORIENTATION: LEFT

## 2023-02-24 NOTE — ED NOTES
Pt ambulatory with steady gait and without use of assistive devices.       Gregorio Cruz RN  02/23/23 [id number]

## 2023-02-24 NOTE — DISCHARGE INSTRUCTIONS
Return to the emergency room for new or worsening symptoms including, not limited to, developing chest pain accompanied by chest pain, nausea, vomiting, dizziness, passing out, feeling as if he cannot pass out, severe shortness of breath, or symptoms/concerns. Medication as prescribed. Follow-up with PCP for reevaluation next 1-2 days. Also, follow-up with the spine specialist for reevaluation by calling provided number schedule appointment for evaluation.

## 2023-02-24 NOTE — ED PROVIDER NOTES
[facility]  ED  EMERGENCY DEPARTMENT ENCOUNTER        Pt Name: Oxana Haji  MRN: [id number]  [place] 2000  Date of evaluation: 2/23/2023  Provider: ANTIONETTE Ontiveros - CNP  PCP: Funmilayo Brown MD  Note Started: 8:34 PM EST 2/23/23       I have seen and evaluated this patient with my supervising physician Avelino Foster MD.      [address]       Chief Complaint   Patient presents with    Back Pain     Between left shoulder blade and spine, radiates around left side starting at 1530. Worse after eating or taking deep breath. HISTORY OF PRESENT ILLNESS: 1 or more Elements     History from : Patient    Limitations to history : None    Oxana Haji is a 26 y.o. nontoxic and well-appearing female in no acute distress who presents to the emergency department for evaluation of a 2-year history of intermittent between his shoulder blades back pain that radiates into her left ribs. The pain is exacerbated by meals and deep inspiration. Denies chest pain, nausea, vomiting, lightheadedness, dizziness, presyncope, shortness of breath, diaphoresis, fever, chills, cough, change in ability to smell/taste, hemoptysis, leg/calf pain or swelling, headache, body aches, abdominal pain, diarrhea, urinary symptoms/retention, other concerns. Nursing Notes were all reviewed and agreed with or any disagreements were addressed in the HPI. REVIEW OF SYSTEMS :      Review of Systems    Positives and Pertinent negatives as per HPI.      SURGICAL HISTORY     Past Surgical History:   Procedure Laterality Date    ADENOIDECTOMY  2004       CURRENTMEDICATIONS       Previous Medications    FLUTICASONE (FLONASE) 50 MCG/ACT NASAL SPRAY    2 sprays by Each Nostril route daily    LEVOCETIRIZINE (XYZAL) 5 MG TABLET    Take 1 tablet by mouth nightly    MONTELUKAST (SINGULAIR) 10 MG TABLET    Take 1 tablet by mouth nightly    NAPROXEN (NAPROSYN) 500 MG TABLET    Take 1 tablet by mouth 2 times daily for 20 doses       ALLERGIES     Motrin [ibuprofen micronized], Clavulanic acid, Ibuprofen, and Penicillins    FAMILYHISTORY       Family History   Problem Relation Age of Onset    Alcohol Abuse Mother     Diabetes Maternal Aunt     Hypothyroidism Maternal Aunt     High Blood Pressure Maternal Aunt     Heart Attack Maternal Aunt         Mid-50s    Stroke Maternal Uncle     High Cholesterol Maternal Grandfather     Heart Disease Maternal Grandfather         SOCIAL HISTORY       Social History     Tobacco Use    Smoking status: Never    Smokeless tobacco: Never   Vaping Use    Vaping Use: Never used   Substance Use Topics    Alcohol use: No    Drug use: Not Currently       SCREENINGS        [place] Coma Scale  Eye Opening: Spontaneous  Best Verbal Response: Oriented  Best Motor Response: Obeys commands  Jennifer Coma Scale Score: 15                CIWA Assessment  BP: (!) 154/105  Heart Rate: (!) 113           PHYSICAL EXAM  1 or more Elements     ED Triage Vitals [02/23/23 1719]   BP Temp Temp Source Heart Rate Resp SpO2 Height Weight   (!) 154/105 98.9 °F (37.2 °C) Oral (!) 133 18 100 % 4' 11\" (1.499 m) 140 lb (63.5 kg)       Physical Exam    ***    DIAGNOSTIC RESULTS   LABS:    Labs Reviewed   CBC WITH AUTO DIFFERENTIAL - Abnormal; Notable for the following components:       Result Value    WBC 13.3 (*)     Neutrophils Absolute 9.8 (*)     All other components within normal limits   COMPREHENSIVE METABOLIC PANEL W/ REFLEX TO MG FOR LOW K - Abnormal; Notable for the following components:    Glucose 102 (*)     All other components within normal limits   D-DIMER, QUANTITATIVE   LACTIC ACID   URINALYSIS WITH REFLEX TO CULTURE   HCG, SERUM, QUALITATIVE       When ordered only abnormal lab results are displayed. All other labs were within normal range or not returned as of this dictation. EKG:  When ordered, EKG's are interpreted by the Emergency Department Physician in the absence of a cardiologist. Please see their note for interpretation of EKG. RADIOLOGY:   Non-plain film images such as CT, Ultrasound and MRI are read by the radiologist. Plain radiographic images are visualized and preliminarily interpreted by the ED Provider with the below findings:    ***    Interpretation per the Radiologist below, if available at the time of this note:    [address]   Final Result   GALLBLADDER IS CONTRACTED BUT NO VISUALIZED STONES. XR CHEST (2 VW)   Final Result   No radiographic evidence of an acute cardiopulmonary process. CT CHEST PULMONARY EMBOLISM W CONTRAST    (Results Pending)     XR CHEST (2 VW)    Result Date: 2/23/2023  EXAMINATION: TWO XRAY VIEWS OF THE CHEST 2/23/2023 2:27 pm COMPARISON: 11/10/2022. HISTORY: ORDERING SYSTEM PROVIDED HISTORY: Upper back pain with tachycardia TECHNOLOGIST PROVIDED HISTORY: Reason for exam:->Upper back pain with tachycardia Reason for Exam: upper back pain with tachycardia FINDINGS: The lungs and costophrenic angles are clear. The cardiomediastinal silhouette and pulmonary vessels appear normal.     No radiographic evidence of an acute cardiopulmonary process. US GALLBLADDER RUQ    Result Date: 2/23/2023  EXAMINATION: RIGHT UPPER QUADRANT ULTRASOUND 2/23/2023 6:36 pm COMPARISON: None. HISTORY: ORDERING SYSTEM PROVIDED HISTORY: Between the shoulder blades pain after meals. TECHNOLOGIST PROVIDED HISTORY: Reason for exam:->Between the shoulder blades pain after meals. FINDINGS: LIVER:  The liver demonstrates normal echogenicity without evidence of intrahepatic biliary ductal dilatation. BILIARY SYSTEM:  GALLBLADDER IS CONTRACTED. NO DEFINITE STONES. Common bile duct is within normal limits measuring 2.8 MM. RIGHT KIDNEY: The right kidney is grossly unremarkable without evidence of hydronephrosis. PANCREAS:  Visualized portions of the pancreas are unremarkable. OTHER: No evidence of right upper quadrant ascites.      GALLBLADDER IS CONTRACTED BUT NO VISUALIZED STONES. No results found. PROCEDURES   Unless otherwise noted below, none     Procedures    CRITICAL CARE TIME (.cctime)   ***    PAST MEDICAL HISTORY      has a past medical history of Asthma. Chronic Conditions affecting Care: ***    EMERGENCY DEPARTMENT COURSE and DIFFERENTIAL DIAGNOSIS/MDM:   Vitals:    Vitals:    02/23/23 1719 02/23/23 2033   BP: (!) 154/105    Pulse: (!) 133 (!) 113   Resp: 18    Temp: 98.9 °F (37.2 °C)    TempSrc: Oral    SpO2: 100%    Weight: 140 lb (63.5 kg)    Height: 4' 11\" (1.499 m)        Patient was given the following medications:  Medications   lidocaine 4 % external patch 1 patch (1 patch TransDERmal Patch Applied 2/23/23 1931)   0.9 % sodium chloride bolus (1,000 mLs IntraVENous New Bag 2/23/23 1933)     Work-up reveals:  ***      Given the patient has persistent tachycardia after fluids we will obtain a CT PE study to rule out pulmonary embolism. CT PE study:  ***        Is this patient to be included in the SEP-1 Core Measure due to severe sepsis or septic shock? {[id number]:99239}    CONSULTS: (Who and What was discussed)  None  {Discussion with Other Profesionals (Optional):41850}    {Social Determinants (Optional):44119::\"None\"}    {Records Reviewed (Optional):02852}    CC/HPI Summary, DDx, ED Course, and Reassessment: ***    Disposition Considerations (include 1 Tests not done, Shared Decision Making, Pt Expectation of Test or Tx.): ***  {Escalation of care, including admission/OBS considered:45859}      I am the Primary Clinician of Record. FINAL IMPRESSION    No diagnosis found. DISPOSITION/PLAN     DISPOSITION     PATIENT REFERRED TO:  No follow-up provider specified.     DISCHARGE MEDICATIONS:  New Prescriptions    No medications on file       DISCONTINUED MEDICATIONS:  Discontinued Medications    No medications on file              (Please note that portions of this note were completed with a voice recognition program.  Efforts were made to edit the dictations but occasionally words are mis-transcribed.)    ANTIONETTE Hoff - CNP (electronically signed)

## 2023-02-24 NOTE — ED PROVIDER NOTES
I independently performed a history and physical on uYmiko Campuzano. All diagnostic, treatment, and disposition decisions were made by myself in conjunction with the advanced practice provider. I personally saw the patient and performed a substantive portion of the visit including all aspects of the medical decision making. For further details of Tracie Lawson's emergency department encounter, please see Marilynn Cutler NP's documentation. Patient is a 72-year-old female presenting today after developing severe left-sided back pain associated with pain with breathing about 10 minutes after eating that wraps around to her chest and upper abdomen. By the time I went into the room, she reported being pain-free although was still tachycardic. She was significantly tachycardic on arrival but states she thinks that was because she was anxious but also in pain. She still feels anxious at this time. Again, she states her pain is currently gone including with deep breaths. She has had this happen about once every 2 months over the last 2 years but has never had a CT scan or further evaluation for it since it normally goes away after 5 or 10 minutes but today it never went away since 3:30 PM.  No fever. No falls or trauma. No lower abdominal pain. Last menstrual cycle was 3 weeks ago. No urinary complaints. No numbness or weakness in the arms or legs. No headache. Physical:   Gen: No acute distress. AOx3.   Psych: Normal mood and affect  HEENT: NCAT, MMM  Neck: supple, normal range of motion, nontender to palpation  Cardiac: Tachycardia, regular rhythm, pulses 2+ in upper extremities, no calf swelling or tenderness bilaterally  Lungs: C2AB, no R/R/W  Abdomen: soft and nontender with no R/D/G  Neuro: Moving all extremities equally, GCS equals 15, normal ambulation  Back: No tenderness to palpation to C/T/L-spine or to paraspinal regions although she did point to the left paraspinal region near T7 as to where she was having the initial pain    The Ekg interpreted by me shows  Sinus tachycardia with a rate of 107  Axis is   Normal  QTc is  within an acceptable range  Intervals and Durations are unremarkable. ST Segments: no acute change and nonspecific changes  No significant change from prior EKG dated - 11/10/22  No STEMI, RSR' present today similar to old EKG, no signs of Brugada syndrome currently         I was the primary provider for the patient along with ELIZABETH Fraire. MDM: Patient was evaluated due to concern for sudden onset left-sided back pain worse with breathing thereafter onto her chest and abdomen. Her initial D-dimer was negative but since she was still tachycardic and initially did have significant pain with breathing, I do believe she will need a CT to ensure no concern for pulmonary embolism. Abdominal exam was benign with no tenderness to palpation. No reported falls or trauma and she ambulated without any difficulty and I do not suspect any spinal cord lesion. She did agree to do the CT and was willing to accept the risk of radiation. Troponin was negative and story not concerning for acute coronary syndrome. Her CT did ultimately come back negative and with her pain greatly improved while in the ED, I do believe she is safe for discharge. She was informed that she may want to follow-up with gastroenterology in case she is having any esophageal issue causing the back pain starting after eating such as esophageal spasm. No trouble swallowing the emergency department.   She is aware that if she does develop any return of persistent pain with severe shortness of breath, vomiting, new onset abdominal pain, or any other concerns over the next few days, then return to the ED, but otherwise follow-up with primary doctor and potentially GI or even potentially see about MRI of the thoracic spine to see if there is any neurological issue although at this time no signs of any obvious focal neurological deficits or concern for any weakness in the legs. She was well-appearing and in no acute distress when I saw her and felt comfortable with this plan.              Joce Mendoza MD  02/24/23 2025

## 2023-02-24 NOTE — ED NOTES
--Patient provided with discharge instructions and any prescriptions.   --Instructions, dosing, and follow-up appointments reviewed with patient/family. No further questions or needs at this time.   --Vital signs and patient stable upon discharge.  --Patient ambulatory to lobby with family.      Nita Waters RN  02/23/23 3972

## 2023-02-27 ASSESSMENT — ENCOUNTER SYMPTOMS
COUGH: 0
SHORTNESS OF BREATH: 0
BACK PAIN: 1
ABDOMINAL PAIN: 0
EYE PAIN: 0
NAUSEA: 0
ANAL BLEEDING: 0
VOMITING: 0
SORE THROAT: 0

## 2023-03-12 ENCOUNTER — HOSPITAL ENCOUNTER (EMERGENCY)
Age: 23
Discharge: HOME | End: 2023-03-12
Payer: MEDICAID

## 2023-03-12 ENCOUNTER — APPOINTMENT (OUTPATIENT)
Dept: CT IMAGING | Age: 23
End: 2023-03-12
Payer: MEDICAID

## 2023-03-12 VITALS
RESPIRATION RATE: 20 BRPM | TEMPERATURE: 98.5 F | HEIGHT: 59 IN | HEART RATE: 80 BPM | WEIGHT: 130 LBS | SYSTOLIC BLOOD PRESSURE: 135 MMHG | DIASTOLIC BLOOD PRESSURE: 85 MMHG | BODY MASS INDEX: 26.21 KG/M2 | OXYGEN SATURATION: 100 %

## 2023-03-12 DIAGNOSIS — S09.90XA INJURY OF HEAD, INITIAL ENCOUNTER: ICD-10-CM

## 2023-03-12 DIAGNOSIS — S00.33XA CONTUSION OF NOSE, INITIAL ENCOUNTER: Primary | ICD-10-CM

## 2023-03-12 LAB — HCG(URINE) PREGNANCY TEST: NEGATIVE

## 2023-03-12 PROCEDURE — 99284 EMERGENCY DEPT VISIT MOD MDM: CPT

## 2023-03-12 PROCEDURE — 70486 CT MAXILLOFACIAL W/O DYE: CPT

## 2023-03-12 PROCEDURE — 6370000000 HC RX 637 (ALT 250 FOR IP): Performed by: PHYSICIAN ASSISTANT

## 2023-03-12 PROCEDURE — 84703 CHORIONIC GONADOTROPIN ASSAY: CPT

## 2023-03-12 RX ORDER — NAPROXEN 250 MG/1
500 TABLET ORAL ONCE
Status: COMPLETED | OUTPATIENT
Start: 2023-03-12 | End: 2023-03-12

## 2023-03-12 RX ORDER — NAPROXEN 500 MG/1
500 TABLET ORAL 2 TIMES DAILY
Qty: 20 TABLET | Refills: 0 | Status: SHIPPED | OUTPATIENT
Start: 2023-03-12 | End: 2023-03-22

## 2023-03-12 RX ADMIN — NAPROXEN 500 MG: 250 TABLET ORAL at 14:24

## 2023-03-12 ASSESSMENT — PAIN - FUNCTIONAL ASSESSMENT
PAIN_FUNCTIONAL_ASSESSMENT: 0-10
PAIN_FUNCTIONAL_ASSESSMENT: 0-10

## 2023-03-12 ASSESSMENT — PAIN DESCRIPTION - DESCRIPTORS: DESCRIPTORS: ACHING

## 2023-03-12 ASSESSMENT — PAIN SCALES - GENERAL
PAINLEVEL_OUTOF10: 5
PAINLEVEL_OUTOF10: 4

## 2023-03-12 ASSESSMENT — PAIN DESCRIPTION - LOCATION
LOCATION: FACE
LOCATION: FACE

## 2023-03-12 NOTE — Clinical Note
Yumiko Campuzano was seen and treated in our emergency department on 3/12/2023. She may return to work on 03/16/2023. If you have any questions or concerns, please don't hesitate to call.       Cheryl Davis PA-C

## 2023-03-13 ASSESSMENT — ENCOUNTER SYMPTOMS
RHINORRHEA: 0
VOMITING: 0
SORE THROAT: 0
COUGH: 0
SHORTNESS OF BREATH: 0
DIARRHEA: 0
EYE REDNESS: 0
NAUSEA: 0
CONSTIPATION: 0
SINUS PAIN: 0
SINUS PRESSURE: 0
EYE DISCHARGE: 0
CHEST TIGHTNESS: 0
ABDOMINAL PAIN: 0

## 2023-03-13 NOTE — ED PROVIDER NOTES
[address] [facility]  ED  EMERGENCY DEPARTMENT ENCOUNTER        Pt Name: Mariaelena Youngblood  MRN: [id number]  [place] 2000  Date of evaluation: 3/12/2023  Provider: Lindsey Quach PA-C  PCP: Veronica Larios MD  Note Started: 9:26 AM EDT 3/13/23      RAMA. I have evaluated this patient. My supervising physician was available for consultation. CHIEF COMPLAINT       Chief Complaint   Patient presents with    Facial Injury     Holding 4year old, child flung head back and struck pt between the eyes. C/o headache and bloody nose after injury. HISTORY OF PRESENT ILLNESS: 1 or more Elements     History from : Patient    Limitations to history : None    Mariaelena Youngblood is a 26 y.o. female who presents for injury to face. Occurred 30 mins pta. Hit in the face by luci head on accident. +associated nosebleed. No LOC, no blood thinners    Nursing Notes were all reviewed and agreed with or any disagreements were addressed in the HPI. REVIEW OF SYSTEMS :      Review of Systems   Constitutional:  Negative for chills and fever. HENT: Negative. Negative for congestion, rhinorrhea, sinus pressure, sinus pain and sore throat. Eyes:  Negative for discharge, redness and visual disturbance. Respiratory:  Negative for cough, chest tightness and shortness of breath. Cardiovascular:  Negative for chest pain and palpitations. Gastrointestinal:  Negative for abdominal pain, constipation, diarrhea, nausea and vomiting. Genitourinary:  Negative for difficulty urinating, dysuria and frequency. Musculoskeletal: Negative. Skin: Negative. Neurological: Negative. Negative for dizziness, weakness, numbness and headaches. Psychiatric/Behavioral: Negative. All other systems reviewed and are negative. Positives and Pertinent negatives as per HPI.      SURGICAL HISTORY     Past Surgical History:   Procedure Laterality Date    ADENOIDECTOMY  2004       [address]       Discharge Medication List as of 3/12/2023  4:23 PM        CONTINUE these medications which have NOT CHANGED    Details   acetaminophen (TYLENOL) 500 MG tablet Take 1 tablet by mouth 4 times daily as needed for Pain, Disp-28 tablet, R-0Print      fluticasone (FLONASE) 50 MCG/ACT nasal spray 2 sprays by Each Nostril route daily, Disp-48 g, R-1Normal      levocetirizine (XYZAL) 5 MG tablet Take 1 tablet by mouth nightly, Disp-30 tablet, R-3Normal      montelukast (SINGULAIR) 10 MG tablet Take 1 tablet by mouth nightly, Disp-90 tablet, R-0Normal             ALLERGIES     Motrin [ibuprofen micronized], Clavulanic acid, Ibuprofen, and Penicillins    FAMILYHISTORY       Family History   Problem Relation Age of Onset    Alcohol Abuse Mother     Diabetes Maternal Aunt     Hypothyroidism Maternal Aunt     High Blood Pressure Maternal Aunt     Heart Attack Maternal Aunt         Mid-50s    Stroke Maternal Uncle     High Cholesterol Maternal Grandfather     Heart Disease Maternal Grandfather         SOCIAL HISTORY       Social History     Tobacco Use    Smoking status: Never    Smokeless tobacco: Never   Vaping Use    Vaping Use: Never used   Substance Use Topics    Alcohol use: No    Drug use: Not Currently       SCREENINGS        Jennifer Coma Scale  Eye Opening: Spontaneous  Best Verbal Response: Oriented  Best Motor Response: Obeys commands  [place] Coma Scale Score: 15                CIWA Assessment  BP: 135/85  Heart Rate: 80           PHYSICAL EXAM  1 or more Elements     ED Triage Vitals [03/12/23 1345]   BP Temp Temp Source Heart Rate Resp SpO2 Height Weight   (!) 140/83 98.5 °F (36.9 °C) Oral (!) 112 14 99 % 4' 11\" (1.499 m) 130 lb (59 kg)       Physical Exam  Vitals and nursing note reviewed. Constitutional:       Appearance: Normal appearance. She is well-developed. She is not diaphoretic. HENT:      Head: Normocephalic and atraumatic. Nose: Nasal deformity, signs of injury, nasal tenderness and mucosal edema present. No septal deviation or laceration. Right Nostril: Epistaxis present. No foreign body, septal hematoma or occlusion. Left Nostril: Epistaxis present. No foreign body, septal hematoma or occlusion. Right Sinus: Maxillary sinus tenderness and frontal sinus tenderness present. Left Sinus: Maxillary sinus tenderness and frontal sinus tenderness present. Mouth/Throat:      Mouth: Mucous membranes are moist.      Pharynx: No oropharyngeal exudate. Eyes:      General:         Right eye: No discharge. Left eye: No discharge. Extraocular Movements: Extraocular movements intact. Conjunctiva/sclera: Conjunctivae normal.      Pupils: Pupils are equal, round, and reactive to light. Cardiovascular:      Rate and Rhythm: Normal rate and regular rhythm. Heart sounds: Normal heart sounds. No murmur heard. No friction rub. No gallop. Pulmonary:      Effort: Pulmonary effort is normal. No respiratory distress. Breath sounds: Normal breath sounds. No wheezing. Abdominal:      General: Bowel sounds are normal. There is no distension. Palpations: Abdomen is soft. Tenderness: There is no abdominal tenderness. Musculoskeletal:         General: Normal range of motion. Cervical back: Normal range of motion. Skin:     General: Skin is warm and dry. Capillary Refill: Capillary refill takes less than 2 seconds. Neurological:      General: No focal deficit present. Mental Status: She is alert. Psychiatric:         Mood and Affect: Mood normal.         Behavior: Behavior normal.           DIAGNOSTIC RESULTS   LABS:    Labs Reviewed   PREGNANCY, URINE       When ordered only abnormal lab results are displayed. All other labs were within normal range or not returned as of this dictation. EKG:  When ordered, EKG's are interpreted by the Emergency Department Physician in the absence of a cardiologist.  Please see their note for interpretation of EKG.    RADIOLOGY:   Non-plain film images such as CT, Ultrasound and MRI are read by the radiologist. Plain radiographic images are visualized and preliminarily interpreted by the ED Provider with the below findings:      Interpretation per the Radiologist below, if available at the time of this note:    CT FACIAL BONES WO CONTRAST   Final Result   No acute facial fracture. Mild mucosal thickening in the maxillary sinuses. CT FACIAL BONES WO CONTRAST    Result Date: 3/12/2023  EXAMINATION: CT OF THE FACE WITHOUT CONTRAST  3/12/2023 1:59 pm TECHNIQUE: CT of the face was performed without the administration of intravenous contrast. Multiplanar reformatted images are provided for review. Automated exposure control, iterative reconstruction, and/or weight based adjustment of the mA/kV was utilized to reduce the radiation dose to as low as reasonably achievable. COMPARISON: None HISTORY: ORDERING SYSTEM PROVIDED HISTORY: head butt to dorsum of nose TECHNOLOGIST PROVIDED HISTORY: Reason for exam:->head butt to dorsum of nose Decision Support Exception - unselect if not a suspected or confirmed emergency medical condition->Emergency Medical Condition (MA) Is the patient pregnant?->No Reason for Exam: pt was holding a child and the child head butted her nose Additional signs and symptoms: nose was bleeding and painful FINDINGS: FACIAL BONES: The frontal sinuses, orbital walls, maxilla, pterygoid plates, zygomatic arches, hard palate, nasal bones and mandible are intact. The temporomandibular joints are aligned. ORBITAL CONTENTS: The globes appear intact. The extraocular muscles, optic nerve sheath complexes and lacrimal glands appear unremarkable. No retrobulbar hematoma or mass is seen. SINUSES: There is mild mucosal thickening in the maxillary sinuses. The paranasal sinuses and mastoid air cells are otherwise normally aerated. SOFT TISSUES: No superficial facial soft tissue swelling is seen.      No acute facial fracture. Mild mucosal thickening in the maxillary sinuses. No results found. PROCEDURES   Unless otherwise noted below, none     Procedures    CRITICAL CARE TIME (.cctime)       PAST MEDICAL HISTORY      has a past medical history of Asthma. Chronic Conditions affecting Care: above    EMERGENCY DEPARTMENT COURSE and DIFFERENTIAL DIAGNOSIS/MDM:   Vitals:    Vitals:    03/12/23 1345 03/12/23 1624   BP: (!) 140/83 135/85   Pulse: (!) 112 80   Resp: 14 20   Temp: 98.5 °F (36.9 °C)    TempSrc: Oral    SpO2: 99% 100%   Weight: 130 lb (59 kg)    Height: 4' 11\" (1.499 m)        Patient was given the following medications:  Medications   naproxen (NAPROSYN) tablet 500 mg (500 mg Oral Given 3/12/23 1424)             CC/HPI Summary, DDx, ED Course, and Reassessment:   Nontoxic patient in no acute distress recent injury to the dorsum of her nose. Tenderness and swelling hematoma formation the bleeding is controlled. Patient is provided with naproxen for pain control imaging is obtained which shows no evidence of an acute fracture patient is advised close follow-up with ENT head injury precautions all questions answered and she is discharged in stable condition. Results for orders placed or performed during the hospital encounter of 03/12/23   Pregnancy, Urine   Result Value Ref Range    HCG(Urine) Pregnancy Test Negative Detects HCG level >20 MIU/mL       I estimate there is LOW risk for ABDOMINAL AORTIC ANEURYSM, CAUDA EQUINA or CENTRAL CORD SYNDROME, COMPARTMENT SYNDROME, EPIDURAL MASS LESION, HERNIATED DISK CAUSING SEVERE STENOSIS, INTRACRANIAL HEMORRHAGE, INTRA-ABDOMINAL INJURY, PERFORATED BOWEL, SUBDURAL HEMATOMA, TENDON or NEUROVASCULAR INJURY, or a THORACIC AORTIC DISSECTION, thus I consider the discharge disposition reasonable. Also, there is no evidence or peritonitis, sepsis, or toxicity.  Mikael Alexander and I have discussed the diagnosis and risks, and we agree with discharging home to follow-up with their primary doctor. We also discussed returning to the Emergency Department immediately if new or worsening symptoms occur. We have discussed the symptoms which are most concerning (e.g., bloody stool, fever, changing or worsening pain, vomiting) that necessitate immediate return. Final Impression    1. Contusion of nose, initial encounter    2. Injury of head, initial encounter        Blood pressure 135/85, pulse 80, temperature 98.5 °F (36.9 °C), temperature source Oral, resp. rate 20, height 4' 11\" (1.499 m), weight 130 lb (59 kg), SpO2 100 %. I am the Primary Clinician of Record. FINAL IMPRESSION      1. Contusion of nose, initial encounter    2.  Injury of head, initial encounter          DISPOSITION/PLAN     DISPOSITION Decision To Discharge 03/12/2023 04:16:46 PM      PATIENT REFERRED TO:  Jose M Puri MD  [address]  [phone]      for a recheck in 2-3  days    Jacky Dey MD  [address] [place] [address]  [phone]      for a recheck in 1-2  days      DISCHARGE MEDICATIONS:  Discharge Medication List as of 3/12/2023  4:23 PM        START taking these medications    Details   naproxen (NAPROSYN) 500 MG tablet Take 1 tablet by mouth 2 times daily for 20 doses, Disp-20 tablet, R-0Normal             DISCONTINUED MEDICATIONS:  Discharge Medication List as of 3/12/2023  4:23 PM                 (Please note that portions of this note were completed with a voice recognition program.  Efforts were made to edit the dictations but occasionally words are mis-transcribed.)    Shruthi Ruth PA-C (electronically signed)           Shruthi Ruth PA-C  03/13/23 3692

## 2023-03-29 ENCOUNTER — HOSPITAL ENCOUNTER (OUTPATIENT)
Age: 23
Discharge: HOME | End: 2023-03-29
Payer: COMMERCIAL

## 2023-03-29 PROCEDURE — 87390 HIV-1 AG IA: CPT

## 2023-03-29 PROCEDURE — 86701 HIV-1ANTIBODY: CPT

## 2023-03-29 PROCEDURE — 84450 TRANSFERASE (AST) (SGOT): CPT

## 2023-03-29 PROCEDURE — 86706 HEP B SURFACE ANTIBODY: CPT

## 2023-03-29 PROCEDURE — 86702 HIV-2 ANTIBODY: CPT

## 2023-03-29 PROCEDURE — 87350 HEPATITIS BE AG IA: CPT

## 2023-03-29 PROCEDURE — 84460 ALANINE AMINO (ALT) (SGPT): CPT

## 2023-03-29 PROCEDURE — 86803 HEPATITIS C AB TEST: CPT

## 2023-03-29 PROCEDURE — 87340 HEPATITIS B SURFACE AG IA: CPT

## 2023-03-30 LAB
ALT SERPL-CCNC: 13 U/L (ref 10–40)
AST SERPL-CCNC: 17 U/L (ref 15–37)
HBV SURFACE AB SERPL IA-ACNC: 13.48 MIU/ML
HBV SURFACE AG SERPL QL IA: NORMAL
HCV AB SERPL QL IA: NORMAL
HIV 1+2 AB+HIV1 P24 AG SERPL QL IA: NORMAL
HIV 2 AB SERPL QL IA: NORMAL
HIV1 AB SERPL QL IA: NORMAL
HIV1 P24 AG SERPL QL IA: NORMAL

## 2023-04-18 ENCOUNTER — HOSPITAL ENCOUNTER (OUTPATIENT)
Age: 23
Discharge: HOME | End: 2023-04-18
Payer: COMMERCIAL

## 2023-04-18 PROCEDURE — 84450 TRANSFERASE (AST) (SGOT): CPT

## 2023-04-18 PROCEDURE — 84460 ALANINE AMINO (ALT) (SGPT): CPT

## 2023-04-18 PROCEDURE — 86803 HEPATITIS C AB TEST: CPT

## 2023-04-19 LAB
ALT SERPL-CCNC: 15 U/L (ref 10–40)
AST SERPL-CCNC: 16 U/L (ref 15–37)
HCV AB SERPL QL IA: NORMAL

## 2023-07-03 ENCOUNTER — OFFICE VISIT (OUTPATIENT)
Dept: PRIMARY CARE CLINIC | Age: 23
End: 2023-07-03
Payer: MEDICAID

## 2023-07-03 VITALS
DIASTOLIC BLOOD PRESSURE: 86 MMHG | HEART RATE: 110 BPM | HEIGHT: 59 IN | RESPIRATION RATE: 16 BRPM | OXYGEN SATURATION: 98 % | BODY MASS INDEX: 30.84 KG/M2 | TEMPERATURE: 98.3 F | SYSTOLIC BLOOD PRESSURE: 130 MMHG | WEIGHT: 153 LBS

## 2023-07-03 DIAGNOSIS — R11.10 VOMITING, UNSPECIFIED VOMITING TYPE, UNSPECIFIED WHETHER NAUSEA PRESENT: ICD-10-CM

## 2023-07-03 DIAGNOSIS — R10.30 LOWER ABDOMINAL PAIN: ICD-10-CM

## 2023-07-03 DIAGNOSIS — K21.9 MILD ACID REFLUX: ICD-10-CM

## 2023-07-03 DIAGNOSIS — K52.9 ACUTE GASTROENTERITIS: Primary | ICD-10-CM

## 2023-07-03 DIAGNOSIS — Z00.00 HEALTHCARE MAINTENANCE: ICD-10-CM

## 2023-07-03 LAB
CONTROL: NORMAL
PREGNANCY TEST URINE, POC: NORMAL

## 2023-07-03 PROCEDURE — 99214 OFFICE O/P EST MOD 30 MIN: CPT

## 2023-07-03 PROCEDURE — 81025 URINE PREGNANCY TEST: CPT

## 2023-07-03 RX ORDER — ONDANSETRON 4 MG/1
4 TABLET, FILM COATED ORAL 3 TIMES DAILY PRN
Qty: 9 TABLET | Refills: 0 | Status: SHIPPED | OUTPATIENT
Start: 2023-07-03 | End: 2023-07-06

## 2023-07-03 RX ORDER — FAMOTIDINE 20 MG/1
20 TABLET, FILM COATED ORAL 2 TIMES DAILY
Qty: 60 TABLET | Refills: 3 | Status: SHIPPED | OUTPATIENT
Start: 2023-07-03

## 2023-07-03 NOTE — PROGRESS NOTES
+-             [address] [facility],[address] and [facility]                                             [address] [facility], [address] [facility], [address] [place] [address] Drive [address]        Phone: [phone]      Name:  Peggy Henderson  :    2000    Consultants:   Patient Care Team:  Ghada Callahan MD as PCP - General (Family Medicine)    Chief Complaint:     Peggy Henderson is a 26 y.o. female  who presents today for an established patient care visit with [address] [place] [address] as noted below. HPI:     Peggy Henderson is a 26 y.o. female who  has a past medical history of Asthma. The patient presents today for acute abdominal pain, acid reflux, persistent nausea and vomiting. Mild acid reflux  Vomiting, unspecified vomiting type, unspecified whether nausea present  Lower abdominal pain  Patient reports on 2023 she started feeling symptoms of acid reflux and could not sleep. She says she woke up on the morning at 5 AM of 7/3/2023 and vomited and had continued to vomit all day. She says she felt a sour burn come back up to the tongue and then went back down. She says she has vomited at least 5 times, thick character over the vomit first resembled what she had recently ate and the next time it was yellow and then the next time it looked like the color of Pepto-Bismol which she has been taking which she says did not help. She does not remember eating anything or drinking anything that made her feel sick recently, she does report she ate at a restaurant Saturday night with her boyfriend. Nothing she has tried is made her feel better. The only medication she takes is Claritin. She denies fever, arthralgia, myalgia, constitutional symptoms. She endorses abdominal pain that wraps bilaterally around to her back vaguely. Patient is sexually active with her boyfriend, whom she lives with. She says her most recent period ended 4 days ago.

## 2023-07-04 ENCOUNTER — TELEPHONE (OUTPATIENT)
Dept: PRIMARY CARE CLINIC | Age: 23
End: 2023-07-04

## 2023-07-04 DIAGNOSIS — R10.30 LOWER ABDOMINAL PAIN: ICD-10-CM

## 2023-07-04 DIAGNOSIS — N39.0 URINARY TRACT INFECTION WITHOUT HEMATURIA, SITE UNSPECIFIED: Primary | ICD-10-CM

## 2023-07-04 LAB
AMORPH SED URNS QL MICRO: ABNORMAL /HPF
BACTERIA URNS QL MICRO: ABNORMAL /HPF
BILIRUB UR QL STRIP.AUTO: NEGATIVE
CHARACTER UR: ABNORMAL
CLARITY UR: ABNORMAL
COLOR UR: ABNORMAL
EPI CELLS #/AREA URNS HPF: ABNORMAL /HPF (ref 0–5)
FINE GRAN CASTS #/AREA URNS HPF: ABNORMAL /LPF (ref 0–2)
GLUCOSE UR STRIP.AUTO-MCNC: NEGATIVE MG/DL
HGB UR QL STRIP.AUTO: ABNORMAL
HYALINE CASTS #/AREA URNS LPF: ABNORMAL /LPF (ref 0–2)
KETONES UR STRIP.AUTO-MCNC: ABNORMAL MG/DL
LEUKOCYTE ESTERASE UR QL STRIP.AUTO: ABNORMAL
MUCOUS THREADS #/AREA URNS LPF: ABNORMAL /LPF
NITRITE UR QL STRIP.AUTO: NEGATIVE
PH UR STRIP.AUTO: 6 [PH] (ref 5–8)
PROT UR STRIP.AUTO-MCNC: 100 MG/DL
RBC #/AREA URNS HPF: ABNORMAL /HPF (ref 0–4)
RENAL EPI CELLS #/AREA UR COMP ASSIST: ABNORMAL /HPF (ref 0–1)
SP GR UR STRIP.AUTO: 1.03 (ref 1–1.03)
UA DIPSTICK W REFLEX MICRO PNL UR: YES
URN SPEC COLLECT METH UR: ABNORMAL
UROBILINOGEN UR STRIP-ACNC: 1 E.U./DL
WBC #/AREA URNS HPF: ABNORMAL /HPF (ref 0–5)

## 2023-07-04 RX ORDER — SULFAMETHOXAZOLE AND TRIMETHOPRIM 800; 160 MG/1; MG/1
1 TABLET ORAL 2 TIMES DAILY
Qty: 14 TABLET | Refills: 0 | Status: SHIPPED
Start: 2023-07-04 | End: 2023-07-04 | Stop reason: CLARIF

## 2023-07-04 NOTE — TELEPHONE ENCOUNTER
Placed phone call to patient regarding results of her urinalysis and POCT pregnancy test.  Patient claims she never got tested. Repeatedly denied that she did any urine testing before she left the office on 7/3/2023. She did say she got the antinausea and anti-GERD medications and she has not vomited since seeing us. She is having a lot of diarrhea but is not throwing up. Her acid reflux symptoms are better she says. She has not eaten anything except a little bit of watermelon which made her feel queasy but she drank water which felt better than the day before. I told patient to come in and get her urinalysis with reflex to culture done, which has been ordered at this encounter. Patient says her only new complaint is that her anterior ribs are hurting when she laughs or breathes out, says she thinks it sore from throwing up so much. She denies any CVA or tenderness when she laughs or breathes a certain way, she denies any difficulty with taking deep breaths in and out again. Patient agreed she will try over-the-counter Tylenol and see if this helps with symptom relief while her abdomen heals. Once again I reminded patient to come get her testing done, she verbalized agreement and understanding.

## 2023-07-05 NOTE — RESULT ENCOUNTER NOTE
Placed phone call to patient regarding results of her urinalysis and POCT pregnancy test.  Patient claims she never got tested. Repeatedly denied that she did any urine testing before she left the office on 7/3/2023. Can somebody please check if patient is not remembering correctly and actually did get the test but just does not actually remember, or if it is possible that the test results for patient were switched with someone else's and thus patient has been given faulty or wrong results and these test results are somebody else's that were ordered on the same day?

## 2023-07-06 DIAGNOSIS — R10.30 LOWER ABDOMINAL PAIN: Primary | ICD-10-CM

## 2023-07-20 ENCOUNTER — TELEPHONE (OUTPATIENT)
Dept: PRIMARY CARE CLINIC | Age: 23
End: 2023-07-20

## 2023-07-20 NOTE — TELEPHONE ENCOUNTER
Blood in stool has been going on for 5 or more years back pain for a year right side pain is by her hip constipation issues her whole life she has been to the ed many times over these issues she is not able to work due to the pain she is coming in tomorrow at 3pm too be seen

## 2023-07-21 ENCOUNTER — OFFICE VISIT (OUTPATIENT)
Dept: PRIMARY CARE CLINIC | Age: 23
End: 2023-07-21

## 2023-07-21 ENCOUNTER — HOSPITAL ENCOUNTER (EMERGENCY)
Age: 23
Discharge: HOME | End: 2023-07-21
Payer: MEDICAID

## 2023-07-21 VITALS
BODY MASS INDEX: 30.24 KG/M2 | DIASTOLIC BLOOD PRESSURE: 82 MMHG | TEMPERATURE: 99.1 F | HEIGHT: 59 IN | HEART RATE: 86 BPM | WEIGHT: 150 LBS | OXYGEN SATURATION: 100 % | RESPIRATION RATE: 14 BRPM | SYSTOLIC BLOOD PRESSURE: 127 MMHG

## 2023-07-21 VITALS
HEIGHT: 59 IN | TEMPERATURE: 100.9 F | SYSTOLIC BLOOD PRESSURE: 126 MMHG | WEIGHT: 153 LBS | DIASTOLIC BLOOD PRESSURE: 84 MMHG | OXYGEN SATURATION: 99 % | HEART RATE: 111 BPM | BODY MASS INDEX: 30.84 KG/M2

## 2023-07-21 DIAGNOSIS — K92.1 BLOOD IN STOOL: ICD-10-CM

## 2023-07-21 DIAGNOSIS — R50.9 FEVER, UNSPECIFIED FEVER CAUSE: ICD-10-CM

## 2023-07-21 DIAGNOSIS — R10.31 RIGHT LOWER QUADRANT PAIN: Primary | ICD-10-CM

## 2023-07-21 DIAGNOSIS — M54.50 ACUTE LEFT-SIDED LOW BACK PAIN, UNSPECIFIED WHETHER SCIATICA PRESENT: ICD-10-CM

## 2023-07-21 DIAGNOSIS — R10.9 LEFT SIDED ABDOMINAL PAIN: Primary | ICD-10-CM

## 2023-07-21 LAB
ALBUMIN SERPL-MCNC: 4.6 G/DL (ref 3.4–5)
ALBUMIN/GLOB SERPL: 1.5 {RATIO} (ref 1.1–2.2)
ALP SERPL-CCNC: 76 U/L (ref 40–129)
ALT SERPL-CCNC: 14 U/L (ref 10–40)
ANION GAP SERPL CALCULATED.3IONS-SCNC: 13 MMOL/L (ref 3–16)
AST SERPL-CCNC: 15 U/L (ref 15–37)
BASOPHILS # BLD: 0.1 K/UL (ref 0–0.2)
BASOPHILS NFR BLD: 0.4 %
BILIRUB SERPL-MCNC: <0.2 MG/DL (ref 0–1)
BILIRUB UR QL STRIP.AUTO: NEGATIVE
BUN SERPL-MCNC: 11 MG/DL (ref 7–20)
CALCIUM SERPL-MCNC: 9.4 MG/DL (ref 8.3–10.6)
CHLORIDE SERPL-SCNC: 104 MMOL/L (ref 99–110)
CLARITY UR: CLEAR
CO2 SERPL-SCNC: 22 MMOL/L (ref 21–32)
COLOR UR: YELLOW
CREAT SERPL-MCNC: 0.6 MG/DL (ref 0.6–1.1)
DEPRECATED RDW RBC AUTO: 13.1 % (ref 12.4–15.4)
EOSINOPHIL # BLD: 0.1 K/UL (ref 0–0.6)
EOSINOPHIL NFR BLD: 0.9 %
GFR SERPLBLD CREATININE-BSD FMLA CKD-EPI: >60 ML/MIN/{1.73_M2}
GLUCOSE SERPL-MCNC: 99 MG/DL (ref 70–99)
GLUCOSE UR STRIP.AUTO-MCNC: NEGATIVE MG/DL
HCG SERPL QL: NEGATIVE
HCT VFR BLD AUTO: 38.6 % (ref 36–48)
HGB BLD-MCNC: 13.4 G/DL (ref 12–16)
HGB UR QL STRIP.AUTO: NEGATIVE
KETONES UR STRIP.AUTO-MCNC: NEGATIVE MG/DL
LEUKOCYTE ESTERASE UR QL STRIP.AUTO: NEGATIVE
LIPASE SERPL-CCNC: 18 U/L (ref 13–60)
LYMPHOCYTES # BLD: 3 K/UL (ref 1–5.1)
LYMPHOCYTES NFR BLD: 25.5 %
MCH RBC QN AUTO: 30.6 PG (ref 26–34)
MCHC RBC AUTO-ENTMCNC: 34.6 G/DL (ref 31–36)
MCV RBC AUTO: 88.3 FL (ref 80–100)
MONOCYTES # BLD: 0.6 K/UL (ref 0–1.3)
MONOCYTES NFR BLD: 5.4 %
NEUTROPHILS # BLD: 7.9 K/UL (ref 1.7–7.7)
NEUTROPHILS NFR BLD: 67.8 %
NITRITE UR QL STRIP.AUTO: NEGATIVE
PH UR STRIP.AUTO: 7 [PH] (ref 5–8)
PLATELET # BLD AUTO: 308 K/UL (ref 135–450)
PMV BLD AUTO: 8.7 FL (ref 5–10.5)
POTASSIUM SERPL-SCNC: 3.8 MMOL/L (ref 3.5–5.1)
PROT SERPL-MCNC: 7.6 G/DL (ref 6.4–8.2)
PROT UR STRIP.AUTO-MCNC: NEGATIVE MG/DL
RBC # BLD AUTO: 4.37 M/UL (ref 4–5.2)
SODIUM SERPL-SCNC: 139 MMOL/L (ref 136–145)
SP GR UR STRIP.AUTO: 1.01 (ref 1–1.03)
UA COMPLETE W REFLEX CULTURE PNL UR: NORMAL
UA DIPSTICK W REFLEX MICRO PNL UR: NORMAL
URN SPEC COLLECT METH UR: NORMAL
UROBILINOGEN UR STRIP-ACNC: 0.2 E.U./DL
WBC # BLD AUTO: 11.7 K/UL (ref 4–11)

## 2023-07-21 PROCEDURE — 99283 EMERGENCY DEPT VISIT LOW MDM: CPT

## 2023-07-21 PROCEDURE — 81003 URINALYSIS AUTO W/O SCOPE: CPT

## 2023-07-21 PROCEDURE — 84703 CHORIONIC GONADOTROPIN ASSAY: CPT

## 2023-07-21 PROCEDURE — 85025 COMPLETE CBC W/AUTO DIFF WBC: CPT

## 2023-07-21 PROCEDURE — 83690 ASSAY OF LIPASE: CPT

## 2023-07-21 PROCEDURE — 80053 COMPREHEN METABOLIC PANEL: CPT

## 2023-07-21 RX ORDER — SUCRALFATE 1 G/1
1 TABLET ORAL 4 TIMES DAILY
Qty: 120 TABLET | Refills: 0 | Status: SHIPPED | OUTPATIENT
Start: 2023-07-21

## 2023-07-21 RX ORDER — PANTOPRAZOLE SODIUM 20 MG/1
20 TABLET, DELAYED RELEASE ORAL
Qty: 30 TABLET | Refills: 0 | Status: SHIPPED | OUTPATIENT
Start: 2023-07-21

## 2023-07-21 ASSESSMENT — PATIENT HEALTH QUESTIONNAIRE - PHQ9
4. FEELING TIRED OR HAVING LITTLE ENERGY: 2
7. TROUBLE CONCENTRATING ON THINGS, SUCH AS READING THE NEWSPAPER OR WATCHING TELEVISION: 2
SUM OF ALL RESPONSES TO PHQ QUESTIONS 1-9: 8
9. THOUGHTS THAT YOU WOULD BE BETTER OFF DEAD, OR OF HURTING YOURSELF: 0
6. FEELING BAD ABOUT YOURSELF - OR THAT YOU ARE A FAILURE OR HAVE LET YOURSELF OR YOUR FAMILY DOWN: 0
SUM OF ALL RESPONSES TO PHQ QUESTIONS 1-9: 8
1. LITTLE INTEREST OR PLEASURE IN DOING THINGS: 0
3. TROUBLE FALLING OR STAYING ASLEEP: 2
10. IF YOU CHECKED OFF ANY PROBLEMS, HOW DIFFICULT HAVE THESE PROBLEMS MADE IT FOR YOU TO DO YOUR WORK, TAKE CARE OF THINGS AT HOME, OR GET ALONG WITH OTHER PEOPLE: 1
8. MOVING OR SPEAKING SO SLOWLY THAT OTHER PEOPLE COULD HAVE NOTICED. OR THE OPPOSITE, BEING SO FIGETY OR RESTLESS THAT YOU HAVE BEEN MOVING AROUND A LOT MORE THAN USUAL: 0
SUM OF ALL RESPONSES TO PHQ9 QUESTIONS 1 & 2: 0
SUM OF ALL RESPONSES TO PHQ QUESTIONS 1-9: 8
5. POOR APPETITE OR OVEREATING: 2
SUM OF ALL RESPONSES TO PHQ QUESTIONS 1-9: 8
2. FEELING DOWN, DEPRESSED OR HOPELESS: 0

## 2023-07-21 ASSESSMENT — PAIN - FUNCTIONAL ASSESSMENT: PAIN_FUNCTIONAL_ASSESSMENT: NONE - DENIES PAIN

## 2023-07-21 ASSESSMENT — ANXIETY QUESTIONNAIRES
GAD7 TOTAL SCORE: 10
1. FEELING NERVOUS, ANXIOUS, OR ON EDGE: 3
7. FEELING AFRAID AS IF SOMETHING AWFUL MIGHT HAPPEN: 1
5. BEING SO RESTLESS THAT IT IS HARD TO SIT STILL: 0
4. TROUBLE RELAXING: 1
6. BECOMING EASILY ANNOYED OR IRRITABLE: 2
3. WORRYING TOO MUCH ABOUT DIFFERENT THINGS: 1
IF YOU CHECKED OFF ANY PROBLEMS ON THIS QUESTIONNAIRE, HOW DIFFICULT HAVE THESE PROBLEMS MADE IT FOR YOU TO DO YOUR WORK, TAKE CARE OF THINGS AT HOME, OR GET ALONG WITH OTHER PEOPLE: SOMEWHAT DIFFICULT
2. NOT BEING ABLE TO STOP OR CONTROL WORRYING: 2

## 2023-07-21 NOTE — PROGRESS NOTES
[address] [facility] and [facility]                                  [address] [place] [address], [address], [address] [address]         Phone: [phone]    Date of Service:  7/21/2023     Patient ID: Amarilys Munguia is a 26 y.o. female      Subjective:     CC:   Chief Complaint   Patient presents with    Abdominal Pain     Sharp Left side wraps around ab worse after eating, deep breathes and moving around   Arm and leg pain. Constipation     Blood in stool         HPI    Patti Munguia 26 y.o.  female with past medical history of anxiety, history of abdominal pain, history of left-sided back pain, history of chronic constipation, presents to the office with the following complaints. Left lateral mid back pain  Right lower quadrant pain  For the last year she has been having similar symptoms. - worse she takes deep breath  - Not consistent  - Mainly after eating  -Patient complains of loose stool for the last 3 days.  -Pain got worse in the last 3 days. - denies any nausea, vomiting, numbness and tingling, weakness, bowel incontinence urinary or urinary incontinence, dysuria, urgency, denies vaginal discharge. Blood mixed in with stool   - for more than 5 years, on and off.   - it is painful sometimes to go to the restroom. last bowel movement yesterday. - In the last three days patient has had bloody softer loose stool.  - Denies any vomiting or diarrhea. - She has taking probiotic mix, but she has developed pain after she has taking it. First day of last menstrual period second or third of July. Normal menstrual cycle. Sexually active with 1 man. Not using any forms of contraceptives at this time. Not planning on getting pregnant. ROS:  Pertinent positives as above in HPI.   Constitutional:  Negative for activity or appetite change, fever or fatigue  HENT:  Negative for congestion, sinus pressure, or rhinorrhea  Eyes:

## 2023-07-21 NOTE — ED PROVIDER NOTES
[address] [place] [address]  ED  EMERGENCY DEPARTMENT ENCOUNTER        Pt Name: Елена Ridley  MRN: [id number]  [address] [facility] 2000  Date of evaluation: 7/21/2023  Provider: ANTIONETTE Carmona CNP  PCP: Michelle Kwon MD  Note Started: 7:58 PM EDT 7/21/23    Evaluated by RAMA. I have evaluated this patient. My supervising physician was available for consultation. CHIEF COMPLAINT       Chief Complaint   Patient presents with    Back Pain     Patient has been having back pain, bloody stools for \"a long time\" and decided to get it checked out today at PCP. Had a fever there so she was sent here for possible gallbladder issues. HISTORY OF PRESENT ILLNESS: 1 or more Elements     History From: Patient  Limitations to history : None    Елена Ridley is a 26 y.o. female who presents to ER with black/flank pain. She went to PCP across the street. She is having chronic constipation, blood in her stool, pain in her left side radiating into her back after eating something. She had a fever at the PCP office, none here when checked. Blood on her stool, seeing tiny spots and streaks. This has been going on for years. The chronic constipation has been going on for years as well, since she was a kid. Denies that she is having pain with BM. Left side pain that happenes after eating. This happens intermitently, is random when it occurs and has been over the past 2 years. It has been present over the past couple of days. The pain starts almost immediately with eating. The pain will also be worse with movement and deep breaths. Aleve does not help with the pain. She did take pepto last night to see if it would help with the pain and it did not. The pain is very sharp and intense pain. Nursing Notes were all reviewed and agreed with or any disagreements were addressed in the HPI. REVIEW OF SYSTEMS :      Review of Systems    Positives and Pertinent negatives as per HPI.      SURGICAL HISTORY     Past Surgical come back to the ER for further work-up and follow-up with both primary care provider as well as GI, referral was given. At this time I have low suspicion for acute surgical abdomen and I do not believe the patient requires imaging at this time. Patient's symptoms are improved after medication and patient is with no other significant physical exam findings. At this point I do not feel the patient requires further work upand it is reasonable to discharge the patient. Please refer to AVS for further details regarding discharge instructions. A discussion was had with the patient regarding diagnosis, diagnostic testing results,treatment/ plan of care, and follow up. All questions were answered. Patient will follow up as directed for further evaluation/treatment. The patient was given strict return precautions as we discussed symptoms that wouldnecessitate return to the ED. Patient will return to ED for new/worsening symptoms. The patient verbalized their understanding and agreement with the above plan. Disposition Considerations (tests considered but not done, Admit vs D/C, Shared Decision Making, Pt Expectation of Test or Tx.): I estimatethere is LOW risk for ACUTE APPENDICITIS, BOWEL OBSTRUCTION, CHOLECYSTITIS, DIVERTICULITIS, INCARCERATED HERNIA, PANCREATITIS, or PERFORATED BOWEL or ULCER, thus I consider the discharge disposition reasonable. Also, thereis no evidence or peritonitis, sepsis, or toxicity. Keira Jovel and I have discussed the diagnosis and risks, and we agree with discharging home to follow-up with their primary doctor. We also discussed returning to the EmergencyDepartment immediately if new or worsening symptoms occur. We have discussed the symptoms which are most concerning (e.g., bloody stool, fever, changing or worsening pain, vomiting) that necessitate immediate return. I am the Primary Clinician of Record. FINAL IMPRESSION      1. Left sided abdominal pain    2.

## 2023-07-22 NOTE — ED NOTES
Writer reviewed discharge instructions, medications, and follow-up with PCP and GI doctor; patient verbalized understanding. Patient's IV removed with no complications with dressing in place. Patient stable, ambulatory with steady gait, GCS 15, no signs/ symptoms of acute distress, respirations unlabored, and with friend.       Gris Valiente RN  07/21/23 2030

## 2023-07-24 DIAGNOSIS — R10.32 LEFT LOWER QUADRANT ABDOMINAL PAIN: Primary | ICD-10-CM

## 2023-07-24 DIAGNOSIS — R10.84 GENERALIZED ABDOMINAL PAIN: ICD-10-CM

## 2023-07-24 DIAGNOSIS — K59.00 CONSTIPATION, UNSPECIFIED CONSTIPATION TYPE: ICD-10-CM

## 2023-07-24 RX ORDER — POLYETHYLENE GLYCOL 3350 17 G/17G
17 POWDER, FOR SOLUTION ORAL DAILY
Qty: 1530 G | Refills: 1 | Status: SHIPPED | OUTPATIENT
Start: 2023-07-24 | End: 2023-08-23

## 2023-07-31 NOTE — PROGRESS NOTES
inadvertently transcribed.   Although I have reviewed the note for such errors, some may still exist.

## 2023-08-01 ENCOUNTER — OFFICE VISIT (OUTPATIENT)
Dept: PRIMARY CARE CLINIC | Age: 23
End: 2023-08-01
Payer: MEDICAID

## 2023-08-01 ENCOUNTER — HOSPITAL ENCOUNTER (OUTPATIENT)
Age: 23
End: 2023-08-01
Payer: MEDICAID

## 2023-08-01 ENCOUNTER — HOSPITAL ENCOUNTER (OUTPATIENT)
Age: 23
Discharge: HOME | End: 2023-08-01
Payer: MEDICAID

## 2023-08-01 ENCOUNTER — HOSPITAL ENCOUNTER (OUTPATIENT)
Dept: CT IMAGING | Age: 23
Discharge: HOME | End: 2023-08-01
Payer: MEDICAID

## 2023-08-01 VITALS
DIASTOLIC BLOOD PRESSURE: 72 MMHG | TEMPERATURE: 99.1 F | RESPIRATION RATE: 16 BRPM | HEIGHT: 59 IN | SYSTOLIC BLOOD PRESSURE: 118 MMHG | BODY MASS INDEX: 30.44 KG/M2 | WEIGHT: 151 LBS | HEART RATE: 80 BPM

## 2023-08-01 DIAGNOSIS — R10.12 LEFT UPPER QUADRANT ABDOMINAL PAIN: Primary | ICD-10-CM

## 2023-08-01 DIAGNOSIS — M79.652 PAIN IN BOTH THIGHS: ICD-10-CM

## 2023-08-01 DIAGNOSIS — R10.84 GENERALIZED ABDOMINAL PAIN: ICD-10-CM

## 2023-08-01 DIAGNOSIS — R10.12 LEFT UPPER QUADRANT ABDOMINAL PAIN: ICD-10-CM

## 2023-08-01 DIAGNOSIS — R10.32 LEFT LOWER QUADRANT ABDOMINAL PAIN: ICD-10-CM

## 2023-08-01 DIAGNOSIS — F41.1 GENERALIZED ANXIETY DISORDER: ICD-10-CM

## 2023-08-01 DIAGNOSIS — M79.651 PAIN IN BOTH THIGHS: ICD-10-CM

## 2023-08-01 LAB
CRP SERPL-MCNC: 4.1 MG/L (ref 0–5.1)
ERYTHROCYTE [SEDIMENTATION RATE] IN BLOOD BY WESTERGREN METHOD: 5 MM/HR (ref 0–20)
IGA SERPL-MCNC: 201 MG/DL (ref 70–400)

## 2023-08-01 PROCEDURE — 82784 ASSAY IGA/IGD/IGG/IGM EACH: CPT

## 2023-08-01 PROCEDURE — 36415 COLL VENOUS BLD VENIPUNCTURE: CPT

## 2023-08-01 PROCEDURE — 86140 C-REACTIVE PROTEIN: CPT

## 2023-08-01 PROCEDURE — 74177 CT ABD & PELVIS W/CONTRAST: CPT

## 2023-08-01 PROCEDURE — 86231 EMA EACH IG CLASS: CPT

## 2023-08-01 PROCEDURE — 99213 OFFICE O/P EST LOW 20 MIN: CPT

## 2023-08-01 PROCEDURE — 83516 IMMUNOASSAY NONANTIBODY: CPT

## 2023-08-01 PROCEDURE — 6360000004 HC RX CONTRAST MEDICATION

## 2023-08-01 PROCEDURE — 85652 RBC SED RATE AUTOMATED: CPT

## 2023-08-01 RX ADMIN — IOPAMIDOL 75 ML: 755 INJECTION, SOLUTION INTRAVENOUS at 14:27

## 2023-08-01 ASSESSMENT — ANXIETY QUESTIONNAIRES
5. BEING SO RESTLESS THAT IT IS HARD TO SIT STILL: 0
IF YOU CHECKED OFF ANY PROBLEMS ON THIS QUESTIONNAIRE, HOW DIFFICULT HAVE THESE PROBLEMS MADE IT FOR YOU TO DO YOUR WORK, TAKE CARE OF THINGS AT HOME, OR GET ALONG WITH OTHER PEOPLE: SOMEWHAT DIFFICULT
3. WORRYING TOO MUCH ABOUT DIFFERENT THINGS: 2
2. NOT BEING ABLE TO STOP OR CONTROL WORRYING: 2
6. BECOMING EASILY ANNOYED OR IRRITABLE: 2
4. TROUBLE RELAXING: 0
GAD7 TOTAL SCORE: 10
1. FEELING NERVOUS, ANXIOUS, OR ON EDGE: 2
7. FEELING AFRAID AS IF SOMETHING AWFUL MIGHT HAPPEN: 2

## 2023-08-01 ASSESSMENT — PATIENT HEALTH QUESTIONNAIRE - PHQ9
DEPRESSION UNABLE TO ASSESS: FUNCTIONAL CAPACITY MOTIVATION LIMITS ACCURACY
2. FEELING DOWN, DEPRESSED OR HOPELESS: 0
SUM OF ALL RESPONSES TO PHQ QUESTIONS 1-9: 3
8. MOVING OR SPEAKING SO SLOWLY THAT OTHER PEOPLE COULD HAVE NOTICED. OR THE OPPOSITE, BEING SO FIGETY OR RESTLESS THAT YOU HAVE BEEN MOVING AROUND A LOT MORE THAN USUAL: 0
5. POOR APPETITE OR OVEREATING: 0
SUM OF ALL RESPONSES TO PHQ9 QUESTIONS 1 & 2: 0
6. FEELING BAD ABOUT YOURSELF - OR THAT YOU ARE A FAILURE OR HAVE LET YOURSELF OR YOUR FAMILY DOWN: 0
SUM OF ALL RESPONSES TO PHQ QUESTIONS 1-9: 3
9. THOUGHTS THAT YOU WOULD BE BETTER OFF DEAD, OR OF HURTING YOURSELF: 0
4. FEELING TIRED OR HAVING LITTLE ENERGY: 2
SUM OF ALL RESPONSES TO PHQ QUESTIONS 1-9: 3
1. LITTLE INTEREST OR PLEASURE IN DOING THINGS: 0
10. IF YOU CHECKED OFF ANY PROBLEMS, HOW DIFFICULT HAVE THESE PROBLEMS MADE IT FOR YOU TO DO YOUR WORK, TAKE CARE OF THINGS AT HOME, OR GET ALONG WITH OTHER PEOPLE: 1
SUM OF ALL RESPONSES TO PHQ QUESTIONS 1-9: 3
3. TROUBLE FALLING OR STAYING ASLEEP: 1
7. TROUBLE CONCENTRATING ON THINGS, SUCH AS READING THE NEWSPAPER OR WATCHING TELEVISION: 0

## 2023-08-02 DIAGNOSIS — R10.12 LEFT UPPER QUADRANT ABDOMINAL PAIN: Primary | ICD-10-CM

## 2023-08-02 LAB
DGP IGA AB: 12.3 U/ML (ref 0–14)
DGP IGG AB: <0.4 U/ML (ref 0–14)
TISSUE TRANSGLUTAMINASE ANTIBODY IGG: <0.8 U/ML (ref 0–14)
TISSUE TRANSGLUTAMINASE IGA: <0.5 U/ML (ref 0–14)

## 2023-08-04 LAB — ENDOMYSIUM IGA TITR SER IF: NORMAL {TITER}

## 2023-08-10 ENCOUNTER — HOSPITAL ENCOUNTER (OUTPATIENT)
Age: 23
Setting detail: SPECIMEN
Discharge: HOME | End: 2023-08-10
Payer: MEDICAID

## 2023-08-10 DIAGNOSIS — R10.32 LEFT LOWER QUADRANT ABDOMINAL PAIN: ICD-10-CM

## 2023-08-10 DIAGNOSIS — R10.84 GENERALIZED ABDOMINAL PAIN: ICD-10-CM

## 2023-08-10 PROCEDURE — 83993 ASSAY FOR CALPROTECTIN FECAL: CPT

## 2023-08-10 PROCEDURE — 82705 FATS/LIPIDS FECES QUAL: CPT

## 2023-08-10 PROCEDURE — 87328 CRYPTOSPORIDIUM AG IA: CPT

## 2023-08-10 PROCEDURE — 87329 GIARDIA AG IA: CPT

## 2023-08-10 PROCEDURE — 87336 ENTAMOEB HIST DISPR AG IA: CPT

## 2023-08-11 LAB
CRYPTOSP AG STL QL IA: NORMAL
E HISTOLYT AG STL QL IA: NORMAL
G LAMBLIA AG STL QL IA: NORMAL

## 2023-08-12 LAB
FAT STL QL: NORMAL
NEUTRAL FAT STL QL: NORMAL

## 2023-08-16 LAB — CALPROTECTIN STL-MCNT: 38 UG/G

## 2023-10-23 ENCOUNTER — OFFICE VISIT (OUTPATIENT)
Dept: PRIMARY CARE CLINIC | Age: 23
End: 2023-10-23
Payer: MEDICAID

## 2023-10-23 VITALS
RESPIRATION RATE: 16 BRPM | WEIGHT: 154.6 LBS | SYSTOLIC BLOOD PRESSURE: 112 MMHG | HEIGHT: 59 IN | DIASTOLIC BLOOD PRESSURE: 68 MMHG | TEMPERATURE: 98.8 F | OXYGEN SATURATION: 98 % | BODY MASS INDEX: 31.17 KG/M2 | HEART RATE: 102 BPM

## 2023-10-23 DIAGNOSIS — M79.652 PAIN IN BOTH THIGHS: Primary | ICD-10-CM

## 2023-10-23 DIAGNOSIS — M79.18 MYOFASCIAL PAIN SYNDROME: ICD-10-CM

## 2023-10-23 DIAGNOSIS — M79.651 PAIN IN BOTH THIGHS: Primary | ICD-10-CM

## 2023-10-23 DIAGNOSIS — K64.9 HEMORRHOIDS, UNSPECIFIED HEMORRHOID TYPE: ICD-10-CM

## 2023-10-23 DIAGNOSIS — K59.00 CONSTIPATION, UNSPECIFIED CONSTIPATION TYPE: ICD-10-CM

## 2023-10-23 DIAGNOSIS — M54.50 PAIN IN RIGHT LUMBAR REGION OF BACK: ICD-10-CM

## 2023-10-23 PROCEDURE — 99213 OFFICE O/P EST LOW 20 MIN: CPT

## 2023-10-23 RX ORDER — POLYETHYLENE GLYCOL 3350 17 G/17G
17 POWDER, FOR SOLUTION ORAL DAILY
Qty: 1 EACH | Refills: 1 | Status: SHIPPED | OUTPATIENT
Start: 2023-10-23 | End: 2023-11-22

## 2023-10-23 RX ORDER — NAPROXEN 500 MG/1
500 TABLET ORAL 2 TIMES DAILY WITH MEALS
Qty: 60 TABLET | Refills: 0 | Status: SHIPPED | OUTPATIENT
Start: 2023-10-23

## 2023-10-23 RX ORDER — GLYCEROL, PHENYLEPHINE, PRAMOXINE, PETROLATUM .25; 1; 15; 14.4 G/100G; G/100G; G/100G; G/100G
1 CREAM TOPICAL 2 TIMES DAILY
Qty: 1 EACH | Refills: 0 | Status: SHIPPED | OUTPATIENT
Start: 2023-10-23

## 2023-10-23 ASSESSMENT — ANXIETY QUESTIONNAIRES
6. BECOMING EASILY ANNOYED OR IRRITABLE: 2
7. FEELING AFRAID AS IF SOMETHING AWFUL MIGHT HAPPEN: 2
1. FEELING NERVOUS, ANXIOUS, OR ON EDGE: 3
5. BEING SO RESTLESS THAT IT IS HARD TO SIT STILL: 0
4. TROUBLE RELAXING: 0
IF YOU CHECKED OFF ANY PROBLEMS ON THIS QUESTIONNAIRE, HOW DIFFICULT HAVE THESE PROBLEMS MADE IT FOR YOU TO DO YOUR WORK, TAKE CARE OF THINGS AT HOME, OR GET ALONG WITH OTHER PEOPLE: SOMEWHAT DIFFICULT
2. NOT BEING ABLE TO STOP OR CONTROL WORRYING: 1
GAD7 TOTAL SCORE: 10
3. WORRYING TOO MUCH ABOUT DIFFERENT THINGS: 2

## 2023-10-23 ASSESSMENT — PATIENT HEALTH QUESTIONNAIRE - PHQ9
3. TROUBLE FALLING OR STAYING ASLEEP: 2
7. TROUBLE CONCENTRATING ON THINGS, SUCH AS READING THE NEWSPAPER OR WATCHING TELEVISION: 2
8. MOVING OR SPEAKING SO SLOWLY THAT OTHER PEOPLE COULD HAVE NOTICED. OR THE OPPOSITE, BEING SO FIGETY OR RESTLESS THAT YOU HAVE BEEN MOVING AROUND A LOT MORE THAN USUAL: 0
SUM OF ALL RESPONSES TO PHQ QUESTIONS 1-9: 6
SUM OF ALL RESPONSES TO PHQ QUESTIONS 1-9: 6
2. FEELING DOWN, DEPRESSED OR HOPELESS: 0
SUM OF ALL RESPONSES TO PHQ QUESTIONS 1-9: 6
6. FEELING BAD ABOUT YOURSELF - OR THAT YOU ARE A FAILURE OR HAVE LET YOURSELF OR YOUR FAMILY DOWN: 0
SUM OF ALL RESPONSES TO PHQ QUESTIONS 1-9: 6
10. IF YOU CHECKED OFF ANY PROBLEMS, HOW DIFFICULT HAVE THESE PROBLEMS MADE IT FOR YOU TO DO YOUR WORK, TAKE CARE OF THINGS AT HOME, OR GET ALONG WITH OTHER PEOPLE: 1
9. THOUGHTS THAT YOU WOULD BE BETTER OFF DEAD, OR OF HURTING YOURSELF: 0
1. LITTLE INTEREST OR PLEASURE IN DOING THINGS: 0
SUM OF ALL RESPONSES TO PHQ9 QUESTIONS 1 & 2: 0
4. FEELING TIRED OR HAVING LITTLE ENERGY: 2
5. POOR APPETITE OR OVEREATING: 0

## 2023-10-23 NOTE — PROGRESS NOTES
[address] [facility]                                  [address] [place] [address], [address], [address] [address]         Phone: [phone]    Date of Service:  10/23/2023     Patient ID: Amarilys Munguia is a 26 y.o. female      Subjective:     CC: \"back pain\"    HPI  Patient is a 26year old female, with PMHx of JOHNNY, who is coming in to the clinic with right back pain that started a week and a half ago. Patient stated the onset was sudden, and noticed that walking makes it worse. Patient has not noticed anything that makes it better. Patient stated the pain is achy in nature, with pain being 10/10, with 10 being the worse pain. Patient stated that the quality of pain is different each day. Patient uses aleve at home, to help her with the pain. She takes it once or twice, every week or two. Patient also stated that she continues to have pain in her left shoulder blade, that shoot down her back and wraps around  her left flank. Patient stated that bending forward makes it worse. Patient described the pain 10/10. She said it has been going on for two years and happened 1-2 times every couple of months.        ROS:    Constitutional:  Negative for activity or appetite change, fever or fatigue  HENT:  Negative for congestion, sinus pressure, or rhinorrhea  Eyes:  Negative for eye pain or visual changes  Resp:  Negative for SOB, chest tightness, cough  Cardiovascular: Negative for CP, palpitations, CONTRERAS, orthopnea, PND, LE edema  Gastrointestinal: Negative for abd pain, melena, BRBPR, N/V/D  Endocrine:  Negative for polydipsia and polyuria  :  Negative for dysuria, flank pain or urinary frequency  Musculoskeletal:  Positive for back pain  Neuro:  Negative for dizziness or lightheadedness  Psych: negative for depression or anxiety        Vitals:    10/23/23 1337   BP: 112/68   Site: Left Upper Arm   Position: Sitting   Cuff Size: Large Adult

## 2023-10-26 NOTE — TELEPHONE ENCOUNTER
We received a medication clarification from [facility] stating that they don't carry the  prescription that we sent in pramox-PE-glycerin-petrolatum (HEMORRHOIDAL) 1-0.25-14.4-15 % cream     Pharmacy would like to know if we can do the Proctocream instead.

## 2023-10-28 RX ORDER — HYDROCORTISONE 25 MG/G
CREAM TOPICAL
Status: CANCELLED | OUTPATIENT
Start: 2023-10-28

## 2023-10-28 NOTE — TELEPHONE ENCOUNTER
Vero Villasenor DO  [facility]  Caller: Unspecified (2 days ago,  4:03 PM)  Please call and advise pharmacy that they can substitute Anusol (previously prescribed) with ProctoCream so long as they are both Hydrocortisone cream 2.5%. Spoke with Attending and confirmed this. If they need us to place a new prescription for it, please let us know. Thank you. Left notifying the pharmacy of approved medication change.

## 2023-11-16 ENCOUNTER — TELEPHONE (OUTPATIENT)
Dept: PRIMARY CARE CLINIC | Age: 23
End: 2023-11-16

## 2023-11-16 NOTE — TELEPHONE ENCOUNTER
Received transferred call from [address] [place] [address] advising that this patient had severe abdominal pain and fevers. [facility] advises that this patient does not have insurance and is concerned about getting a bill from this office. Received call, spoke with patient. Patient states that she has been running fevers, has had severe abdominal pain, nausea and vomiting for 3 or 4 days. States that she has had on and off chest pains like she has in the past. Patient states that she has had headaches and sinus- infection type symptoms. Patient states that she went to urgent care yesterday and they did zero tests on her, just gave her an antibiotic and sent her home. Patient is concerned she needs further evaluation and treatment. Due to patient not currently having health insurance, she does not want to go to the ED as suggested by this writer. She states she has been to the ED recently for similar symptoms, they did nothing to help her and does not want to go back for nothing and get stuck with another bill. Writer encouraged patient to present to ED for evaluation. Patient does not want to go to ED, rather make an appointment in either this office or the free clinic office at [address] [place] [address] Dr LEBLANC Offered to make patient an appointment in this office, but could not tell patient what total cost of visit would be. Patient was reluctant to schedule due to financial constraints. Gave patient the contact information for the free clinic, to call and see if they are able to get her in today. Asked patient to please call this office back and let us know whether or not she was able to get an appointment to be evaluated.

## 2024-01-08 ENCOUNTER — OFFICE VISIT (OUTPATIENT)
Dept: PRIMARY CARE CLINIC | Age: 24
End: 2024-01-08
Payer: COMMERCIAL

## 2024-01-08 VITALS
TEMPERATURE: 97 F | SYSTOLIC BLOOD PRESSURE: 132 MMHG | BODY MASS INDEX: 31.1 KG/M2 | WEIGHT: 154 LBS | DIASTOLIC BLOOD PRESSURE: 88 MMHG

## 2024-01-08 DIAGNOSIS — R10.13 EPIGASTRIC PAIN: Primary | ICD-10-CM

## 2024-01-08 PROCEDURE — 99214 OFFICE O/P EST MOD 30 MIN: CPT

## 2024-01-08 RX ORDER — FLUTICASONE PROPIONATE 50 MCG
1 SPRAY, SUSPENSION (ML) NASAL DAILY
COMMUNITY

## 2024-01-08 RX ORDER — POLYETHYLENE GLYCOL 3350 17 G/17G
17 POWDER, FOR SOLUTION ORAL DAILY
Qty: 510 G | Refills: 2 | Status: SHIPPED | OUTPATIENT
Start: 2024-01-08 | End: 2024-04-07

## 2024-01-08 RX ORDER — CETIRIZINE HYDROCHLORIDE 10 MG/1
10 TABLET ORAL DAILY
COMMUNITY

## 2024-01-08 ASSESSMENT — PATIENT HEALTH QUESTIONNAIRE - PHQ9
2. FEELING DOWN, DEPRESSED OR HOPELESS: 1
10. IF YOU CHECKED OFF ANY PROBLEMS, HOW DIFFICULT HAVE THESE PROBLEMS MADE IT FOR YOU TO DO YOUR WORK, TAKE CARE OF THINGS AT HOME, OR GET ALONG WITH OTHER PEOPLE: 1
SUM OF ALL RESPONSES TO PHQ QUESTIONS 1-9: 10
4. FEELING TIRED OR HAVING LITTLE ENERGY: 3
7. TROUBLE CONCENTRATING ON THINGS, SUCH AS READING THE NEWSPAPER OR WATCHING TELEVISION: 2
8. MOVING OR SPEAKING SO SLOWLY THAT OTHER PEOPLE COULD HAVE NOTICED. OR THE OPPOSITE, BEING SO FIGETY OR RESTLESS THAT YOU HAVE BEEN MOVING AROUND A LOT MORE THAN USUAL: 0
5. POOR APPETITE OR OVEREATING: 1
SUM OF ALL RESPONSES TO PHQ QUESTIONS 1-9: 10
9. THOUGHTS THAT YOU WOULD BE BETTER OFF DEAD, OR OF HURTING YOURSELF: 0
1. LITTLE INTEREST OR PLEASURE IN DOING THINGS: 1
3. TROUBLE FALLING OR STAYING ASLEEP: 2
SUM OF ALL RESPONSES TO PHQ9 QUESTIONS 1 & 2: 2
SUM OF ALL RESPONSES TO PHQ QUESTIONS 1-9: 10
6. FEELING BAD ABOUT YOURSELF - OR THAT YOU ARE A FAILURE OR HAVE LET YOURSELF OR YOUR FAMILY DOWN: 0
SUM OF ALL RESPONSES TO PHQ QUESTIONS 1-9: 10

## 2024-01-08 ASSESSMENT — ANXIETY QUESTIONNAIRES
4. TROUBLE RELAXING: 0
1. FEELING NERVOUS, ANXIOUS, OR ON EDGE: 2
6. BECOMING EASILY ANNOYED OR IRRITABLE: 1
7. FEELING AFRAID AS IF SOMETHING AWFUL MIGHT HAPPEN: 2
GAD7 TOTAL SCORE: 8
3. WORRYING TOO MUCH ABOUT DIFFERENT THINGS: 2
5. BEING SO RESTLESS THAT IT IS HARD TO SIT STILL: 0
IF YOU CHECKED OFF ANY PROBLEMS ON THIS QUESTIONNAIRE, HOW DIFFICULT HAVE THESE PROBLEMS MADE IT FOR YOU TO DO YOUR WORK, TAKE CARE OF THINGS AT HOME, OR GET ALONG WITH OTHER PEOPLE: SOMEWHAT DIFFICULT
2. NOT BEING ABLE TO STOP OR CONTROL WORRYING: 1

## 2024-01-08 NOTE — PROGRESS NOTES
PROGRESS NOTE   [facility]                                  [address], [address], [place] [address]         Phone: [phone]    Date of Service:  1/8/2024     Patient ID: Lana Johansen is a 23 y.o. female      Subjective:     CC: feeling ill    HPI  Lana Johansen is a 23 y.o. female w/ a Hx of anxiety who presents for feeling ill.    Abdominal pain  - Current Sx of vomiting, abdominal pain, no appetite since Thursday  - Abdominal pain is epigastric, worse after eating. Took dose of Pepto Bismul without relief  - Fever 100 and vomiting x1 Friday night. Hasn't really eaten since. Fever resolved without antipyretic  - Drinking Gatorade 1 bottle a day. Occasional water and tea.  - Just finished period last week. No current possibility for pregnancy  - Aunt has Hx ovarian cysts and miscarriage.   - Hx of constipation. Usual BM every 1-2 a week  - Last BM was this morning after 1 wk of constipation  - Previously told to start Miralax for constipation and hemorrhoid cream for previous episode of rectal bleeding but pt never started either    - Pt also reports usually having congestion, stuffy nose, ill feeling once a month. None currently. Takes Zyrtec and Flonase daily  - Also reports random aches and pains in several different joints (wrist, back, shoulder, etc)  - Is tired of feeling sick all the time.  - Denies any knowledge of symptoms correlating to menstrual cycle, stress level, or food        ROS:    Review of Systems   All other systems reviewed and are negative.        Vitals:  Vitals:    01/08/24 1311   BP: 132/88   Site: Right Upper Arm   Position: Sitting   Cuff Size: Medium Adult   Temp: 97 °F (36.1 °C)   TempSrc: Infrared   Weight: 69.9 kg (154 lb)       Allergies:  Motrin [ibuprofen micronized], Amoxicillin, Clavulanic acid, Ibuprofen, and Penicillins    Outpatient Medications Marked as Taking for the 1/8/24 encounter

## 2024-01-08 NOTE — PROGRESS NOTES
PROGRESS NOTE   [facility] Medicine Residency [facility]                                  [address], [address], [place] [address]         Phone: [phone]    Date of Service:  1/8/2024     Patient ID: Sarah Johansen is a 23 y.o. female      Subjective:     CC: No chief complaint on file.      HPI  ***    ROS:    {ROS FCMRP:07747}        There were no vitals filed for this visit.    Allergies:  Motrin [ibuprofen micronized], Amoxicillin, Clavulanic acid, Ibuprofen, and Penicillins    No outpatient medications have been marked as taking for the 1/8/24 encounter (Appointment) with Yocasta Ariza DO.         Objective:   {physical exam FCMRP:83006}        Assessment / Plan:     There are no diagnoses linked to this encounter.      Health Maitenance: ***    Health care decision maker:  {health care decision maker allegra:10765}  Vot-ER:  {vo+ER allegra:87148}      {resident attestation to med student note:94422}    EMR Dragon/transcription disclaimer:  Much of this encounter note is electronic transcription/translation of spoken language to printed texts.  The electronic translation of spoken language may be erroneous, or at times, nonsensical words or phrases may be inadvertently transcribed.  Although I have reviewed the note for such errors, some may still exist.

## 2024-02-05 ENCOUNTER — TELEPHONE (OUTPATIENT)
Dept: PRIMARY CARE CLINIC | Age: 24
End: 2024-02-05

## 2024-02-05 NOTE — TELEPHONE ENCOUNTER
----- Message from Gila Ferguson sent at 2/5/2024  1:08 PM EST -----  Subject: Message to Provider    QUESTIONS  Information for Provider? pt. has an appt on 02/09 but doesn't want to   come in just to be told to give it a couple of weeks wants to know if she   will be able to get any further testing to see what's going on, states the   nausea and stomach pain has gotten worst, stomach wont stop making noises,   appetite decrease some, stomach feels having and constantly burping   ---------------------------------------------------------------------------  --------------  CALL BACK INFO  [phone]; OK to leave message on voicemail,OK to respond with electronic   message via [employer] portal (only for patients who have registered [employer]   account)  ---------------------------------------------------------------------------  --------------  SCRIPT ANSWERS  Relationship to Patient? Self

## 2024-09-17 ENCOUNTER — OFFICE VISIT (OUTPATIENT)
Dept: PRIMARY CARE CLINIC | Age: 24
End: 2024-09-17
Payer: COMMERCIAL

## 2024-09-17 VITALS
OXYGEN SATURATION: 99 % | DIASTOLIC BLOOD PRESSURE: 90 MMHG | SYSTOLIC BLOOD PRESSURE: 134 MMHG | BODY MASS INDEX: 30.64 KG/M2 | TEMPERATURE: 98.9 F | WEIGHT: 152 LBS | HEART RATE: 94 BPM | HEIGHT: 59 IN

## 2024-09-17 DIAGNOSIS — R01.1 FLOW MURMUR: ICD-10-CM

## 2024-09-17 DIAGNOSIS — M54.50 ACUTE RIGHT-SIDED LOW BACK PAIN WITHOUT SCIATICA: ICD-10-CM

## 2024-09-17 DIAGNOSIS — B96.89 BACTERIAL SINUSITIS: Primary | ICD-10-CM

## 2024-09-17 DIAGNOSIS — J32.9 BACTERIAL SINUSITIS: Primary | ICD-10-CM

## 2024-09-17 PROCEDURE — 99214 OFFICE O/P EST MOD 30 MIN: CPT | Performed by: STUDENT IN AN ORGANIZED HEALTH CARE EDUCATION/TRAINING PROGRAM

## 2024-09-17 RX ORDER — CYCLOBENZAPRINE HCL 10 MG
10 TABLET ORAL NIGHTLY PRN
Qty: 10 TABLET | Refills: 0 | Status: SHIPPED | OUTPATIENT
Start: 2024-09-17 | End: 2024-09-27

## 2024-09-17 RX ORDER — DOXYCYCLINE HYCLATE 100 MG
100 TABLET ORAL 2 TIMES DAILY
Qty: 14 TABLET | Refills: 0 | Status: SHIPPED | OUTPATIENT
Start: 2024-09-17 | End: 2024-09-24

## 2024-09-17 SDOH — ECONOMIC STABILITY: FOOD INSECURITY: WITHIN THE PAST 12 MONTHS, YOU WORRIED THAT YOUR FOOD WOULD RUN OUT BEFORE YOU GOT MONEY TO BUY MORE.: NEVER TRUE

## 2024-09-17 SDOH — ECONOMIC STABILITY: FOOD INSECURITY: WITHIN THE PAST 12 MONTHS, THE FOOD YOU BOUGHT JUST DIDN'T LAST AND YOU DIDN'T HAVE MONEY TO GET MORE.: NEVER TRUE

## 2024-09-17 SDOH — ECONOMIC STABILITY: INCOME INSECURITY: HOW HARD IS IT FOR YOU TO PAY FOR THE VERY BASICS LIKE FOOD, HOUSING, MEDICAL CARE, AND HEATING?: NOT HARD AT ALL

## 2024-09-21 ENCOUNTER — OFFICE VISIT (OUTPATIENT)
Age: 24
End: 2024-09-21

## 2024-09-21 VITALS
OXYGEN SATURATION: 98 % | HEART RATE: 74 BPM | SYSTOLIC BLOOD PRESSURE: 114 MMHG | DIASTOLIC BLOOD PRESSURE: 74 MMHG | TEMPERATURE: 98.6 F

## 2024-09-21 DIAGNOSIS — R05.1 ACUTE COUGH: ICD-10-CM

## 2024-09-21 DIAGNOSIS — B34.9 VIRAL ILLNESS: Primary | ICD-10-CM

## 2024-09-21 RX ORDER — DEXTROMETHORPHAN HYDROBROMIDE AND PROMETHAZINE HYDROCHLORIDE 15; 6.25 MG/5ML; MG/5ML
5 SYRUP ORAL 4 TIMES DAILY PRN
Qty: 100 ML | Refills: 0 | Status: SHIPPED | OUTPATIENT
Start: 2024-09-21 | End: 2024-09-26

## 2024-09-21 RX ORDER — PREDNISONE 20 MG/1
20 TABLET ORAL 2 TIMES DAILY
Qty: 10 TABLET | Refills: 0 | Status: SHIPPED | OUTPATIENT
Start: 2024-09-21 | End: 2024-09-26

## 2024-09-21 RX ORDER — ALBUTEROL SULFATE 90 UG/1
2 INHALANT RESPIRATORY (INHALATION) EVERY 4 HOURS PRN
COMMUNITY
Start: 2024-09-06

## 2025-01-03 ENCOUNTER — OFFICE VISIT (OUTPATIENT)
Dept: PRIMARY CARE CLINIC | Age: 25
End: 2025-01-03
Payer: COMMERCIAL

## 2025-01-03 VITALS
HEART RATE: 87 BPM | WEIGHT: 148.6 LBS | SYSTOLIC BLOOD PRESSURE: 112 MMHG | TEMPERATURE: 98.3 F | BODY MASS INDEX: 30 KG/M2 | DIASTOLIC BLOOD PRESSURE: 82 MMHG | OXYGEN SATURATION: 99 %

## 2025-01-03 DIAGNOSIS — J06.9 ACUTE UPPER RESPIRATORY INFECTION, UNSPECIFIED: ICD-10-CM

## 2025-01-03 DIAGNOSIS — R09.81 HEAD CONGESTION: ICD-10-CM

## 2025-01-03 DIAGNOSIS — J02.9 SORE THROAT: Primary | ICD-10-CM

## 2025-01-03 PROCEDURE — 99213 OFFICE O/P EST LOW 20 MIN: CPT

## 2025-01-03 ASSESSMENT — PATIENT HEALTH QUESTIONNAIRE - PHQ9
1. LITTLE INTEREST OR PLEASURE IN DOING THINGS: NOT AT ALL
SUM OF ALL RESPONSES TO PHQ QUESTIONS 1-9: 0
2. FEELING DOWN, DEPRESSED OR HOPELESS: NOT AT ALL
SUM OF ALL RESPONSES TO PHQ9 QUESTIONS 1 & 2: 0
SUM OF ALL RESPONSES TO PHQ QUESTIONS 1-9: 0

## 2025-01-03 NOTE — PROGRESS NOTES
productive, sore throat.   Denies head congestion, endorses nasal stuffiness, fevers or chills, headache today but had one yesterday, rhinorrhea, eye irritation, SOB, chest pain, abdominal pain or nausea.           1/8/2024     1:16 PM 10/23/2023     1:44 PM 8/1/2023    12:34 PM 7/21/2023     4:00 PM 2/17/2023    10:00 AM 12/7/2022     9:00 AM 11/23/2022     1:00 PM   JOHNNY 7 SCORE   JOHNNY-7 Total Score 8 10 10 10 0 3 1     Interpretation of JOHNNY-7 score: 5-9 = mild anxiety, 10-14 = moderate anxiety, 15+ = severe anxiety. Recommend referral to behavioral health for scores 10 or greater.        1/3/2025     2:50 PM 1/8/2024     1:15 PM 10/23/2023     1:44 PM 8/1/2023    12:33 PM 7/21/2023     4:09 PM 2/17/2023    10:49 AM 12/7/2022     9:50 AM   PHQ Scores   PHQ2 Score 0 2 0 0 0  0   PHQ9 Score 0 10 6 3 8 0 2     Interpretation of Total Score Depression Severity: 1-4 = Minimal depression, 5-9 = Mild depression, 10-14 = Moderate depression, 15-19 = Moderately severe depression, 20-27 = Severe depression        ROS is otherwise negative except for pertinent positives and negatives specifically noted above.    Outpatient Medications Marked as Taking for the 1/3/25 encounter (Office Visit) with Maximino Rebollar, DO   Medication Sig Dispense Refill    albuterol sulfate HFA (PROVENTIL;VENTOLIN;PROAIR) 108 (90 Base) MCG/ACT inhaler Inhale 2 puffs into the lungs every 4 hours as needed       OBJECTIVE:   /82 (Site: Left Upper Arm, Position: Sitting, Cuff Size: Small Adult)   Pulse 87   Temp 98.3 °F (36.8 °C) (Oral)   Wt 67.4 kg (148 lb 9.6 oz)   LMP 12/24/2024 (Exact Date)   SpO2 99%   BMI 30.00 kg/m²   Wt Readings from Last 3 Encounters:   01/03/25 67.4 kg (148 lb 9.6 oz)   09/17/24 68.9 kg (152 lb)   01/08/24 69.9 kg (154 lb)       Physical Examination:  General appearance - alert, well appearing, and in no distress and oriented to person, place, and time.  Mental status - normal mood, behavior, speech, dress,

## 2025-01-08 DIAGNOSIS — R09.82 POST-NASAL DRIP: ICD-10-CM

## 2025-01-08 DIAGNOSIS — J02.9 SORE THROAT: Primary | ICD-10-CM

## 2025-01-08 RX ORDER — LEVOCETIRIZINE DIHYDROCHLORIDE 5 MG/1
5 TABLET, FILM COATED ORAL NIGHTLY
Qty: 30 TABLET | Refills: 3 | Status: SHIPPED | OUTPATIENT
Start: 2025-01-08

## 2025-01-08 RX ORDER — FLUTICASONE PROPIONATE 50 MCG
2 SPRAY, SUSPENSION (ML) NASAL DAILY
Qty: 48 G | Refills: 1 | Status: SHIPPED | OUTPATIENT
Start: 2025-01-08

## 2025-01-08 NOTE — PROGRESS NOTES
Reviewed records, patient has history of sore throat with picture that seems like allergic rhinitis with upper airway cough syndrome and postnasal drip, I also check my notes when I saw patient from December 2022 when I saw patient and there was similar clinical picture is what is happening now.  Antibiotics did not help patient's symptoms previously.  She has allergy to penicillin and amoxicillin, thus in 2022 as well as recently she was given course of antibiotics- azithromycin- without resolution.    As I did previously, I will treat this as an allergic rhinitis/upper airway cough syndrome/postnasal drip.  Instead of antibiotics, I will defer on starting a course of cefdinir since patient already finished a course of antibiotics and instead I will send prescription for Flonase and levocetirizine.  If not improved on these 2 then I will add Singulair.  If not improved after that then I will consider cefdinir 300 mg BID for 7 days which covers both bacterial rhinosinusitis as well as strep pharyngitis.      Will communicate plan to patient via [employer].

## 2025-01-20 ENCOUNTER — TELEPHONE (OUTPATIENT)
Dept: CARDIOLOGY CLINIC | Age: 25
End: 2025-01-20

## 2025-01-20 NOTE — TELEPHONE ENCOUNTER
Pt stated that she would like to speak with someone from med staff regarding her anxiety and palpitation feeling. Pt stated that she has been experiencing these symptoms for a few weeks. Pt has has no recent cardiac testing. No medication changes. Pt last saw srj in 2022. Please advise.

## 2025-01-21 NOTE — TELEPHONE ENCOUNTER
Called and spoke with pt, pt sts she believes she is having anxiety and has general health anxiety and palps are getting worse. Pt wanted to make ov, NPDE had opening, pt v/u to time/date/location. Reviewed pts echo results from Dr. Figueroa from 11/2022 with pt, pt v/u.

## 2025-02-10 ENCOUNTER — OFFICE VISIT (OUTPATIENT)
Age: 25
End: 2025-02-10

## 2025-02-10 ENCOUNTER — OFFICE VISIT (OUTPATIENT)
Dept: PRIMARY CARE CLINIC | Age: 25
End: 2025-02-10
Payer: COMMERCIAL

## 2025-02-10 VITALS
DIASTOLIC BLOOD PRESSURE: 66 MMHG | SYSTOLIC BLOOD PRESSURE: 110 MMHG | HEIGHT: 59 IN | WEIGHT: 145 LBS | HEART RATE: 83 BPM | TEMPERATURE: 97.4 F | OXYGEN SATURATION: 99 % | BODY MASS INDEX: 29.23 KG/M2

## 2025-02-10 VITALS
SYSTOLIC BLOOD PRESSURE: 118 MMHG | HEART RATE: 85 BPM | HEIGHT: 59 IN | DIASTOLIC BLOOD PRESSURE: 70 MMHG | OXYGEN SATURATION: 98 % | WEIGHT: 146.2 LBS | BODY MASS INDEX: 29.48 KG/M2

## 2025-02-10 DIAGNOSIS — M24.9 HYPERMOBILITY OF JOINT: Primary | ICD-10-CM

## 2025-02-10 DIAGNOSIS — J02.9 SORE THROAT: ICD-10-CM

## 2025-02-10 DIAGNOSIS — J02.9 PHARYNGITIS, UNSPECIFIED ETIOLOGY: Primary | ICD-10-CM

## 2025-02-10 LAB — S PYO AG THROAT QL: NORMAL

## 2025-02-10 PROCEDURE — 99213 OFFICE O/P EST LOW 20 MIN: CPT | Performed by: STUDENT IN AN ORGANIZED HEALTH CARE EDUCATION/TRAINING PROGRAM

## 2025-02-10 SDOH — ECONOMIC STABILITY: FOOD INSECURITY: WITHIN THE PAST 12 MONTHS, THE FOOD YOU BOUGHT JUST DIDN'T LAST AND YOU DIDN'T HAVE MONEY TO GET MORE.: NEVER TRUE

## 2025-02-10 SDOH — ECONOMIC STABILITY: FOOD INSECURITY: WITHIN THE PAST 12 MONTHS, YOU WORRIED THAT YOUR FOOD WOULD RUN OUT BEFORE YOU GOT MONEY TO BUY MORE.: NEVER TRUE

## 2025-02-10 ASSESSMENT — ANXIETY QUESTIONNAIRES
IF YOU CHECKED OFF ANY PROBLEMS ON THIS QUESTIONNAIRE, HOW DIFFICULT HAVE THESE PROBLEMS MADE IT FOR YOU TO DO YOUR WORK, TAKE CARE OF THINGS AT HOME, OR GET ALONG WITH OTHER PEOPLE: VERY DIFFICULT
4. TROUBLE RELAXING: SEVERAL DAYS
7. FEELING AFRAID AS IF SOMETHING AWFUL MIGHT HAPPEN: MORE THAN HALF THE DAYS
2. NOT BEING ABLE TO STOP OR CONTROL WORRYING: MORE THAN HALF THE DAYS
3. WORRYING TOO MUCH ABOUT DIFFERENT THINGS: SEVERAL DAYS
GAD7 TOTAL SCORE: 11
6. BECOMING EASILY ANNOYED OR IRRITABLE: SEVERAL DAYS
1. FEELING NERVOUS, ANXIOUS, OR ON EDGE: NEARLY EVERY DAY
5. BEING SO RESTLESS THAT IT IS HARD TO SIT STILL: SEVERAL DAYS

## 2025-02-10 ASSESSMENT — ENCOUNTER SYMPTOMS
TROUBLE SWALLOWING: 0
SINUS PRESSURE: 0
ABDOMINAL PAIN: 0
SORE THROAT: 1
SINUS PAIN: 0
VOMITING: 0
NAUSEA: 0
COUGH: 0
SHORTNESS OF BREATH: 0

## 2025-02-10 ASSESSMENT — PATIENT HEALTH QUESTIONNAIRE - PHQ9
SUM OF ALL RESPONSES TO PHQ QUESTIONS 1-9: 0
SUM OF ALL RESPONSES TO PHQ9 QUESTIONS 1 & 2: 0
SUM OF ALL RESPONSES TO PHQ QUESTIONS 1-9: 0
SUM OF ALL RESPONSES TO PHQ QUESTIONS 1-9: 0
1. LITTLE INTEREST OR PLEASURE IN DOING THINGS: NOT AT ALL
2. FEELING DOWN, DEPRESSED OR HOPELESS: NOT AT ALL
SUM OF ALL RESPONSES TO PHQ QUESTIONS 1-9: 0

## 2025-02-10 NOTE — PROGRESS NOTES
Lana Johansen (:  2000) is a 25 y.o. female,Established patient, here for evaluation of the following chief complaint(s):  Pharyngitis and Headache (Pressure, exp to strep)      ASSESSMENT/PLAN:    ICD-10-CM    1. Pharyngitis, unspecified etiology  J02.9 Culture, Throat      2. Sore throat  J02.9 POCT rapid strep A          Exposure to strep but negative exam.  Will send throat culture     SUBJECTIVE/OBJECTIVE:    History provided by:  Patient   used: No    Pharyngitis  Associated symptoms: headaches and sore throat    Associated symptoms: no abdominal pain, no congestion, no cough, no ear pain, no fatigue, no fever, no myalgias, no nausea, no rash, no shortness of breath and no vomiting    Headache    HPI:   25 y.o. female presents with symptoms of sore throat, headache ongoing since the last 1 day. Denies fever, SOB, n/v/d. Has taken nothing for symptoms so far. Has had exposure to strep throat.     Vitals:    02/10/25 1830   BP: 110/66   Pulse: 83   Temp: 97.4 °F (36.3 °C)   TempSrc: Temporal   SpO2: 99%   Weight: 65.8 kg (145 lb)   Height: 1.499 m (4' 11\")       Review of Systems   Constitutional:  Negative for fatigue and fever.   HENT:  Positive for sore throat. Negative for congestion, ear pain, sinus pressure, sinus pain and trouble swallowing.    Respiratory:  Negative for cough and shortness of breath.    Gastrointestinal:  Negative for abdominal pain, nausea and vomiting.   Musculoskeletal:  Negative for myalgias.   Skin:  Negative for rash.   Neurological:  Positive for headaches.       Physical Exam  Constitutional:       General: She is not in acute distress.     Appearance: Normal appearance. She is not ill-appearing.   HENT:      Right Ear: Tympanic membrane normal.      Left Ear: Tympanic membrane normal.      Nose:      Right Sinus: No maxillary sinus tenderness or frontal sinus tenderness.      Left Sinus: No maxillary sinus tenderness or frontal sinus tenderness.

## 2025-02-10 NOTE — PROGRESS NOTES
PROGRESS NOTE   [facility] Medicine Residency [facility]                                  [address], [address], [place] [address]         Phone: [phone]    Date of Service:  2/10/2025     Patient ID: Sarah Johansen is a 25 y.o. 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 Patient read up on the syndrome and is anxious over the possibility of having vascular manifestations of the disease. She is concerned about spider veins she has on thighs. She denies having any significant scars or poorly healed wounds. She reports having back pain for the past four years that happens once every 3 months that she takes naproxen for. Pain will go away typically after 30 minutes to 3 hours.       ROS:    Constitutional:  Negative for activity or appetite change, fever or fatigue  HENT:  Negative for congestion, sinus pressure, or rhinorrhea  Eyes:  Negative for eye pain or visual changes  Resp:  Negative for SOB, chest tightness, cough  Cardiovascular: Negative for CP, palpitations, CONTRERAS, orthopnea, PND, LE edema  Gastrointestinal: Negative for abd pain, melena, BRBPR, N/V/D  Endocrine:  Negative for polydipsia and polyuria  :  Negative for dysuria, flank pain or urinary frequency  Musculoskeletal:  Negative for myalgias  Neuro:  Negative for dizziness or lightheadedness  Psych: negative for depression        Vitals:    02/10/25 0859   BP: 118/70   Site: Left Upper Arm   Position: Sitting   Cuff Size: Medium Adult   Pulse: 85   SpO2: 98%   Weight: 66.3 kg (146 lb 3.2 oz)   Height: 1.499 m (4' 11.02\")       Allergies:  Motrin [ibuprofen micronized], Amoxicillin, Clavulanic acid, Ibuprofen, and Penicillins    Outpatient Medications Marked as Taking for the 2/10/25 encounter (Office Visit) with Blu De La Torre

## 2025-02-10 NOTE — PATIENT INSTRUCTIONS
[facility]) (multiple locations)   [phone]     [facility]   [phone]     [facility]   [phone]     [facility])  [phone]     [facility]   [address] [facility], [address]   [phone]      Counseling Services    [facility]  [address]  [place], [place] [address]  [phone]    [facility] Counseling Service   [address] [place], [place] [address]  [phone]      Psychiatrists    Dr. Richard Brown Dr. Neil Dubin  [address] [facility]    [address], [place] [address]   [place], [place] [address]  [phone]    Dr. Fredis Diez  [address] [facility]   [address] [place], [place] [address]   [place], [place] [address]  [phone]      Dr.Jerome JASMINA Vu  [address] [facility]   [address] [facility]  [place], [place] [address]   [place] [address]  [phone]    Dr. Hemanth Gr  [address] [facility]   [address] [facility].  [place], [place] [address]   [place], [place] [address]  [phone]      Dr. Oscar Portillo  [address] [facility]   [address] [place], [place] [address]   [place], [place] [address]  [phone]    Dr. Negin San  [address] [facility]  [place], [place] [address]  [phone]    Community Mental Health Agencies    [facility]   [facility]  [address]    [address] [place], [place] [address]   [place], [place] [address]  [phone]    [facility] ([facility]) [facility]  [address] [facility]    [address] [place] [address]  [phone]    [facility] ([facility])  [facility]

## 2025-02-11 NOTE — PATIENT INSTRUCTIONS
We will call with throat culture results.   Rest and Increase fluids.  Try taking a daily antihistamine such as Claritin or Zyrtec.     Let your PCP know of your Urgent Care visit and follow up with them if symptoms are not improving.  If any worsening of symptoms go to ER for further workup and assessment.     The patient tolerated their visit well. The patient and/or the family were informed of the results of any tests, a time was given to answer questions, a plan was proposed and they agreed with plan. Reviewed AVS with treatment instructions and answered questions - pt/family expresses understanding and agreement with the discussed treatment plan and AVS instructions.

## 2025-02-13 LAB — BACTERIA THROAT AEROBE CULT: NORMAL

## 2025-02-22 ENCOUNTER — OFFICE VISIT (OUTPATIENT)
Age: 25
End: 2025-02-22

## 2025-02-22 VITALS — OXYGEN SATURATION: 99 % | SYSTOLIC BLOOD PRESSURE: 122 MMHG | DIASTOLIC BLOOD PRESSURE: 78 MMHG | HEART RATE: 91 BPM

## 2025-02-22 DIAGNOSIS — R52 GENERALIZED BODY ACHES: ICD-10-CM

## 2025-02-22 DIAGNOSIS — R19.7 DIARRHEA, UNSPECIFIED TYPE: ICD-10-CM

## 2025-02-22 DIAGNOSIS — R09.81 NASAL CONGESTION: ICD-10-CM

## 2025-02-22 DIAGNOSIS — R11.0 NAUSEA: ICD-10-CM

## 2025-02-22 DIAGNOSIS — U07.1 COVID-19: Primary | ICD-10-CM

## 2025-02-22 LAB
INFLUENZA A ANTIBODY: NEGATIVE
INFLUENZA B ANTIBODY: NEGATIVE
Lab: ABNORMAL
PERFORMING INSTRUMENT: ABNORMAL
QC PASS/FAIL: ABNORMAL
SARS-COV-2, POC: DETECTED

## 2025-02-22 RX ORDER — ONDANSETRON 4 MG/1
4 TABLET, ORALLY DISINTEGRATING ORAL EVERY 6 HOURS PRN
Qty: 20 TABLET | Refills: 0 | Status: SHIPPED | OUTPATIENT
Start: 2025-02-22 | End: 2025-02-22

## 2025-02-22 RX ORDER — ONDANSETRON 4 MG/1
4 TABLET, ORALLY DISINTEGRATING ORAL EVERY 6 HOURS PRN
Qty: 20 TABLET | Refills: 0 | Status: SHIPPED | OUTPATIENT
Start: 2025-02-22 | End: 2025-02-27

## 2025-02-22 NOTE — PATIENT INSTRUCTIONS
If medications were prescribed, please take as directed.     You were positive for COVID.     Expect 5-7 days of symptoms. You have a few days of feeling worse before you feel better.     Alternate Tylenol and Motrin every 4 hours as directed as needed for fever.     Push fluids.     Maintain basic infection control behaviors like washing hands, covering mouth.     Return if worsening symptoms such as cough, fever. Go to ER if you have significant Shortness of breath, chest pain, or other major concerning symptoms.

## 2025-02-22 NOTE — PROGRESS NOTES
Lana Johansen (:  2000) is a 25 y.o. female,  here for evaluation of the following chief complaint(s): Headache (Headache, abdominal pain, diarrhea and body aches, congestion/X thursday)    Lana Johansen is a: Established patient.   Last Urgent Care Visit: 2/10/2025  I have reviewed the patient's medications and basic medical history; see Medication Reconciliation.    ASSESSMENT/PLAN:  Diagnosis:     ICD-10-CM    1. COVID-19  U07.1       2. Diarrhea, unspecified type  R19.7 POCT Influenza A/B      3. Generalized body aches  R52 POCT Influenza A/B      4. Nasal congestion  R09.81 POCT Influenza A/B     POCT COVID-19, Antigen      5. Nausea  R11.0 ondansetron (ZOFRAN-ODT) 4 MG disintegrating tablet     DISCONTINUED: ondansetron (ZOFRAN-ODT) 4 MG disintegrating tablet             Medical Decision Making:   Patient was seen at Urgent Care today for mild nausea, diarrhea, sore throat and ear pain.  Symptoms are improving by today.    On presentation she appears well.  No acute distress.    TMs clear bilaterally.  Oropharynx clear.  No concern for strep pharyngitis.    Influenza was NEGATIVE.   COVID is POSITIVE.  This clinically correlates    Patient is on day 4 of symptoms.  She is otherwise young and healthy.  No indication for Paxlovid.  She is provided with work note.  Symptoms are already improving and I suspect she will be near baseline in the next 24 to 48 hours.    Patient was discharged home with follow-up and return precautions.    Patient did not have elevated blood pressure greater than 130/80. Therefore, referral to PCP for HTN is not indicated      Results:  Results for orders placed or performed in visit on 25   POCT Influenza A/B   Result Value Ref Range    Influenza A Ab negative     Influenza B Ab negative    POCT COVID-19, Antigen   Result Value Ref Range    SARS-COV-2, POC Detected (A) Not Detected    Lot Number [id number]     QC Pass/Fail pass     Performing Instrument [employer]

## 2025-02-27 ENCOUNTER — TELEPHONE (OUTPATIENT)
Dept: CARDIOLOGY CLINIC | Age: 25
End: 2025-02-27

## 2025-02-27 DIAGNOSIS — R00.2 PALPITATIONS: Primary | ICD-10-CM

## 2025-02-27 NOTE — TELEPHONE ENCOUNTER
Attempted to reach pt, left vm to call the office back. Pt had ov with [employer] scheduled today, pt tested positive for covid 02/22. Per npdd, she would like to move apt back, and have pt wear 1 week monitor. We can ship to her house or have her come in on lab schedule.   Alma said pt can be rescheduled for 03/13 @ 1:30.

## 2025-04-03 ENCOUNTER — OFFICE VISIT (OUTPATIENT)
Age: 25
End: 2025-04-03

## 2025-04-03 VITALS
HEART RATE: 108 BPM | BODY MASS INDEX: 27.5 KG/M2 | DIASTOLIC BLOOD PRESSURE: 66 MMHG | SYSTOLIC BLOOD PRESSURE: 118 MMHG | HEIGHT: 59 IN | OXYGEN SATURATION: 99 % | WEIGHT: 136.4 LBS | TEMPERATURE: 99.5 F

## 2025-04-03 DIAGNOSIS — R11.0 NAUSEA: ICD-10-CM

## 2025-04-03 DIAGNOSIS — J06.9 UPPER RESPIRATORY TRACT INFECTION, UNSPECIFIED TYPE: Primary | ICD-10-CM

## 2025-04-03 LAB
INFLUENZA A ANTIBODY: NORMAL
INFLUENZA B ANTIBODY: NORMAL
Lab: NORMAL
PERFORMING INSTRUMENT: NORMAL
QC PASS/FAIL: NORMAL
SARS-COV-2, POC: NORMAL

## 2025-04-03 RX ORDER — ONDANSETRON 4 MG/1
4 TABLET, ORALLY DISINTEGRATING ORAL 3 TIMES DAILY PRN
Qty: 21 TABLET | Refills: 0 | Status: SHIPPED | OUTPATIENT
Start: 2025-04-03

## 2025-04-03 ASSESSMENT — ENCOUNTER SYMPTOMS
VOMITING: 0
COUGH: 0
ABDOMINAL PAIN: 0
SORE THROAT: 0
RHINORRHEA: 0
CONSTIPATION: 0
DIARRHEA: 0
NAUSEA: 1
CHEST TIGHTNESS: 0
SINUS PRESSURE: 0
SHORTNESS OF BREATH: 0

## 2025-04-03 NOTE — PATIENT INSTRUCTIONS
Please start Flonase nasal spray.   Please start Mucinex if you have increased congestion.   If symptoms persist beyond 7 days.

## 2025-04-03 NOTE — PROGRESS NOTES
Lana Johansen (:  2000) is a 25 y.o. female,Established patient, here for evaluation of the following chief complaint(s):  Nausea (Nausea and cold and sweaty today and also has bodyaches and nasal congestion. These symptoms started this morning. Exposed to others with vomiting/diarrhea and also hand foot and mouth recently.)      ASSESSMENT/PLAN:    ICD-10-CM    1. Upper respiratory tract infection, unspecified type  J06.9       2. Nausea  R11.0 POCT COVID-19, Antigen     POCT Influenza A/B     ondansetron (ZOFRAN-ODT) 4 MG disintegrating tablet          Patient presents for nausea and joint aches   POCT COVID: Negative  POCT INFLUENZA: negative  DDX: URI vs early gastroenteritis  Will rx zofran to take as needed for nauase  Please start Flonase nasal spray.   Please start Mucinex if you have increased congestion.   If symptoms persist beyond 7 days.     SUBJECTIVE/OBJECTIVE:    History provided by:  Patient   used: No      HPI:   25 y.o. female presents with symptoms of nausea chills and body aches ongoing since today. She states that she has been exposed to others with vomiting and diarrhea and hand foot mouth disease. She states that she has been ok since onset.   She states that she is not having symptoms currently. Felt fatigued this morning. Does not believe she is pregnant.       Vitals:    25 1818   BP: 118/66   Pulse: (!) 108   Temp: 99.5 °F (37.5 °C)   TempSrc: Oral   SpO2: 99%   Weight: 61.9 kg (136 lb 6.4 oz)   Height: 1.499 m (4' 11\")       Review of Systems   Constitutional:  Positive for fatigue. Negative for chills, diaphoresis and fever.   HENT:  Negative for congestion, rhinorrhea, sinus pressure and sore throat.    Respiratory:  Negative for cough, chest tightness and shortness of breath.    Cardiovascular:  Negative for chest pain.   Gastrointestinal:  Positive for nausea. Negative for abdominal pain, constipation, diarrhea and vomiting.   Musculoskeletal:

## 2025-04-04 ENCOUNTER — OFFICE VISIT (OUTPATIENT)
Dept: PRIMARY CARE CLINIC | Age: 25
End: 2025-04-04

## 2025-04-04 VITALS
HEIGHT: 59 IN | SYSTOLIC BLOOD PRESSURE: 122 MMHG | BODY MASS INDEX: 27.34 KG/M2 | DIASTOLIC BLOOD PRESSURE: 74 MMHG | HEART RATE: 63 BPM | TEMPERATURE: 98.3 F | WEIGHT: 135.6 LBS | RESPIRATION RATE: 16 BRPM | OXYGEN SATURATION: 99 %

## 2025-04-04 DIAGNOSIS — R55 PRE-SYNCOPE: Primary | ICD-10-CM

## 2025-04-04 DIAGNOSIS — R11.0 NAUSEA: ICD-10-CM

## 2025-04-04 DIAGNOSIS — R63.4 WEIGHT LOSS: ICD-10-CM

## 2025-04-04 DIAGNOSIS — R55 PRE-SYNCOPE: ICD-10-CM

## 2025-04-04 ASSESSMENT — ENCOUNTER SYMPTOMS
NAUSEA: 1
SHORTNESS OF BREATH: 0
BLOOD IN STOOL: 0
VOMITING: 0

## 2025-04-04 NOTE — PROGRESS NOTES
Lana Johansen (:  2000) is a 25 y.o. female,Established patient, here for evaluation of the following chief complaint(s):  Nausea (Patient started not feeling well yesterday felt like she had to puke but didn't went to the kitchen and blacked out vision and hearing went out sat down got really cold and really hot has not happened since then she is not feeling good still not sure if its stomach bug was around her mother who has it right now she just wants to make sure she is ok )      Assessment & Plan  Pre-syncope    EKG in office normal.  Presyncopal episodes likely secondary to vagal secondary to significant nausea that patient has been experiencing.  Will further evaluate with CMP as well as CBC to rule out any electrolyte, liver, or anemia.    Orders:    EKG 12 Lead    Comprehensive Metabolic Panel; Future    CBC with Auto Differential; Future    Nausea   Acute condition, new, Supportive care with appropriate antipyretics and fluids.  Most likely secondary to viral gastroenteritis    Orders:    Comprehensive Metabolic Panel; Future    CBC with Auto Differential; Future    Weight loss    Planned weight loss, patient has been dieting as well as exercising to help with weight loss.  No significant concerns about underlying pathology at this time    Orders:    Comprehensive Metabolic Panel; Future    CBC with Auto Differential; Future      No follow-ups on file.    Subjective       Dizziness  Quality:  Lightheadedness (Presyncopal)  Severity:  Mild  Onset quality:  Sudden  Timin.  Progression:  Partially resolved  Chronicity:  New  Context comment:  While having ongoing abdominal pain  Ineffective treatments:  None tried  Associated symptoms: nausea    Associated symptoms: no blood in stool, no chest pain, no headaches, no hearing loss, no shortness of breath, no syncope, no tinnitus and no vomiting    Risk factors: no anemia, no heart disease, no hx of stroke, no hx of vertigo, no Meniere's

## 2025-04-05 LAB
ALBUMIN SERPL-MCNC: 4.8 G/DL (ref 3.4–5)
ALBUMIN/GLOB SERPL: 1.8 {RATIO} (ref 1.1–2.2)
ALP SERPL-CCNC: 50 U/L (ref 40–129)
ALT SERPL-CCNC: 15 U/L (ref 10–40)
ANION GAP SERPL CALCULATED.3IONS-SCNC: 11 MMOL/L (ref 3–16)
AST SERPL-CCNC: 18 U/L (ref 15–37)
BASOPHILS # BLD: 0.1 K/UL (ref 0–0.2)
BASOPHILS NFR BLD: 0.8 %
BILIRUB SERPL-MCNC: 0.3 MG/DL (ref 0–1)
BUN SERPL-MCNC: 9 MG/DL (ref 7–20)
CALCIUM SERPL-MCNC: 10 MG/DL (ref 8.3–10.6)
CHLORIDE SERPL-SCNC: 102 MMOL/L (ref 99–110)
CO2 SERPL-SCNC: 24 MMOL/L (ref 21–32)
CREAT SERPL-MCNC: 0.6 MG/DL (ref 0.6–1.1)
DEPRECATED RDW RBC AUTO: 12.9 % (ref 12.4–15.4)
EOSINOPHIL # BLD: 0.1 K/UL (ref 0–0.6)
EOSINOPHIL NFR BLD: 0.8 %
GFR SERPLBLD CREATININE-BSD FMLA CKD-EPI: >90 ML/MIN/{1.73_M2}
GLUCOSE SERPL-MCNC: 97 MG/DL (ref 70–99)
HCT VFR BLD AUTO: 41.8 % (ref 36–48)
HGB BLD-MCNC: 14.4 G/DL (ref 12–16)
LYMPHOCYTES # BLD: 2.1 K/UL (ref 1–5.1)
LYMPHOCYTES NFR BLD: 22.1 %
MCH RBC QN AUTO: 31 PG (ref 26–34)
MCHC RBC AUTO-ENTMCNC: 34.3 G/DL (ref 31–36)
MCV RBC AUTO: 90.3 FL (ref 80–100)
MONOCYTES # BLD: 0.5 K/UL (ref 0–1.3)
MONOCYTES NFR BLD: 5.3 %
NEUTROPHILS # BLD: 6.8 K/UL (ref 1.7–7.7)
NEUTROPHILS NFR BLD: 71 %
PLATELET # BLD AUTO: 266 K/UL (ref 135–450)
PMV BLD AUTO: 10.6 FL (ref 5–10.5)
POTASSIUM SERPL-SCNC: 4.1 MMOL/L (ref 3.5–5.1)
PROT SERPL-MCNC: 7.5 G/DL (ref 6.4–8.2)
RBC # BLD AUTO: 4.63 M/UL (ref 4–5.2)
SODIUM SERPL-SCNC: 137 MMOL/L (ref 136–145)
WBC # BLD AUTO: 9.5 K/UL (ref 4–11)

## 2025-04-07 ENCOUNTER — RESULTS FOLLOW-UP (OUTPATIENT)
Dept: PRIMARY CARE CLINIC | Age: 25
End: 2025-04-07

## 2025-05-02 ENCOUNTER — RESULTS FOLLOW-UP (OUTPATIENT)
Dept: PRIMARY CARE CLINIC | Age: 25
End: 2025-05-02

## 2025-05-02 ENCOUNTER — OFFICE VISIT (OUTPATIENT)
Dept: PRIMARY CARE CLINIC | Age: 25
End: 2025-05-02

## 2025-05-02 VITALS
TEMPERATURE: 98.4 F | WEIGHT: 132.4 LBS | OXYGEN SATURATION: 98 % | BODY MASS INDEX: 26.69 KG/M2 | DIASTOLIC BLOOD PRESSURE: 60 MMHG | SYSTOLIC BLOOD PRESSURE: 98 MMHG | HEART RATE: 73 BPM | HEIGHT: 59 IN

## 2025-05-02 DIAGNOSIS — J02.9 SORE THROAT: ICD-10-CM

## 2025-05-02 DIAGNOSIS — R05.1 ACUTE COUGH: Primary | ICD-10-CM

## 2025-05-02 DIAGNOSIS — R06.2 WHEEZING: ICD-10-CM

## 2025-05-02 LAB
EXP DATE SOLUTION: NORMAL
EXP DATE SWAB: NORMAL
EXPIRATION DATE: NORMAL
INFLUENZA A RNA, POC: NORMAL
INFLUENZA B RNA, POC: NORMAL
LOT NUMBER POC: NORMAL
LOT NUMBER SOLUTION: NORMAL
LOT NUMBER SWAB: NORMAL
S PYO AG THROAT QL: NORMAL
SARS-COV-2 RNA, POC: NEGATIVE
VALID INTERNAL CONTROL: NORMAL

## 2025-05-02 RX ORDER — ALBUTEROL SULFATE 90 UG/1
2 INHALANT RESPIRATORY (INHALATION) EVERY 4 HOURS PRN
Qty: 18 G | Refills: 1 | Status: SHIPPED | OUTPATIENT
Start: 2025-05-02

## 2025-05-02 RX ORDER — PREDNISONE 20 MG/1
20 TABLET ORAL 2 TIMES DAILY
Qty: 10 TABLET | Refills: 0 | Status: SHIPPED | OUTPATIENT
Start: 2025-05-02 | End: 2025-05-07

## 2025-05-23 ENCOUNTER — OFFICE VISIT (OUTPATIENT)
Dept: PRIMARY CARE CLINIC | Age: 25
End: 2025-05-23

## 2025-05-23 VITALS
HEART RATE: 72 BPM | WEIGHT: 130 LBS | SYSTOLIC BLOOD PRESSURE: 102 MMHG | DIASTOLIC BLOOD PRESSURE: 66 MMHG | OXYGEN SATURATION: 97 % | RESPIRATION RATE: 16 BRPM | HEIGHT: 59 IN | BODY MASS INDEX: 26.21 KG/M2

## 2025-05-23 DIAGNOSIS — I89.0 LYMPHEDEMA: Primary | ICD-10-CM

## 2025-05-23 ASSESSMENT — ENCOUNTER SYMPTOMS: SWOLLEN GLANDS: 1

## 2025-05-23 NOTE — PROGRESS NOTES
Lana Johansen (:  2000) is a 25 y.o. female,Established patient, here for evaluation of the following chief complaint(s):  Follow-up (When patient was here last she had a cold going on and had a swollen lymph node and was told to keep an eye on it now she feels like she has another swollen one in her neck just wants to make sure she is ok)      Assessment & Plan  Lymphedema  - Likely secondary to recent viral infection  - Encouraged to continue conservative management, warm compress, lymph massage, NSAIDs for pain  - RTC if symptoms persist for >1month  - Minimal concerns or history of cancer in family           No follow-ups on file.    Subjective       Lymphedema  Concerns as it has been persistent for about 2 weeks now  Mildly tender initially, however pain has improved within 3 days, now the lymph persists  No concerns of B-symptoms: night sweats, unintended weight loss, fevers, chills, pain,    Mass  This is a new problem. The current episode started 1 to 4 weeks ago. The problem has been gradually improving. Associated symptoms include swollen glands (right axilla and right anterior cervical chain). She has tried nothing for the symptoms. The treatment provided moderate relief.     Review of Systems            Objective     /66 (BP Site: Left Upper Arm, Patient Position: Sitting, BP Cuff Size: Small Adult)   Pulse 72   Resp 16   Ht 1.499 m (4' 11.02\")   Wt 59 kg (130 lb)   LMP 2025 (Exact Date)   SpO2 97%   BMI 26.24 kg/m²      Physical Exam  Constitutional:       Appearance: Normal appearance.   HENT:      Head: Normocephalic.   Eyes:      Conjunctiva/sclera: Conjunctivae normal.   Cardiovascular:      Rate and Rhythm: Normal rate and regular rhythm.      Pulses: Normal pulses.      Heart sounds: Normal heart sounds.   Pulmonary:      Effort: Pulmonary effort is normal.      Breath sounds: Normal breath sounds.   Abdominal:      General: Abdomen is flat. Bowel sounds are

## 2025-08-12 ENCOUNTER — OFFICE VISIT (OUTPATIENT)
Dept: PRIMARY CARE CLINIC | Age: 25
End: 2025-08-12

## 2025-08-12 VITALS
BODY MASS INDEX: 24.72 KG/M2 | OXYGEN SATURATION: 98 % | SYSTOLIC BLOOD PRESSURE: 100 MMHG | DIASTOLIC BLOOD PRESSURE: 60 MMHG | HEART RATE: 100 BPM | WEIGHT: 122.6 LBS | HEIGHT: 59 IN | TEMPERATURE: 98 F

## 2025-08-12 DIAGNOSIS — R55 PRE-SYNCOPE: ICD-10-CM

## 2025-08-12 DIAGNOSIS — J32.9 RECURRENT SINUS INFECTIONS: ICD-10-CM

## 2025-08-12 DIAGNOSIS — R09.81 HEAD CONGESTION: ICD-10-CM

## 2025-08-12 DIAGNOSIS — R50.9 FEVER, UNSPECIFIED FEVER CAUSE: Primary | ICD-10-CM

## 2025-08-12 DIAGNOSIS — R05.1 ACUTE COUGH: ICD-10-CM

## 2025-08-12 LAB
EXP DATE SOLUTION: NORMAL
EXP DATE SWAB: NORMAL
EXPIRATION DATE: NORMAL
HETEROPHILE ANTIBODIES: NEGATIVE
INFLUENZA A RNA, POC: NORMAL
INFLUENZA B RNA, POC: NORMAL
LOT NUMBER POC: NORMAL
LOT NUMBER SOLUTION: NORMAL
LOT NUMBER SWAB: NORMAL
SARS-COV-2 RNA, POC: NEGATIVE
VALID INTERNAL CONTROL: NORMAL

## 2025-08-13 ASSESSMENT — ENCOUNTER SYMPTOMS
COUGH: 1
SINUS PRESSURE: 1
EYE DISCHARGE: 0
EYE REDNESS: 0
CONSTIPATION: 0
ABDOMINAL DISTENTION: 0
SINUS PAIN: 0
SHORTNESS OF BREATH: 0
PHOTOPHOBIA: 0
EYE PAIN: 0
VOMITING: 0
SORE THROAT: 0
DIARRHEA: 0
EYE ITCHING: 0
FACIAL SWELLING: 0